# Patient Record
Sex: FEMALE
[De-identification: names, ages, dates, MRNs, and addresses within clinical notes are randomized per-mention and may not be internally consistent; named-entity substitution may affect disease eponyms.]

---

## 2021-10-28 ENCOUNTER — APPOINTMENT (OUTPATIENT)
Dept: INTERNAL MEDICINE | Facility: CLINIC | Age: 66
End: 2021-10-28
Payer: COMMERCIAL

## 2021-10-28 ENCOUNTER — NON-APPOINTMENT (OUTPATIENT)
Age: 66
End: 2021-10-28

## 2021-10-28 VITALS
SYSTOLIC BLOOD PRESSURE: 120 MMHG | RESPIRATION RATE: 13 BRPM | OXYGEN SATURATION: 97 % | DIASTOLIC BLOOD PRESSURE: 80 MMHG | WEIGHT: 137 LBS | BODY MASS INDEX: 22.02 KG/M2 | HEART RATE: 87 BPM | TEMPERATURE: 98 F | HEIGHT: 66 IN

## 2021-10-28 VITALS
OXYGEN SATURATION: 97 % | WEIGHT: 137 LBS | SYSTOLIC BLOOD PRESSURE: 120 MMHG | BODY MASS INDEX: 22.02 KG/M2 | HEIGHT: 66 IN | HEART RATE: 87 BPM | TEMPERATURE: 98.1 F | RESPIRATION RATE: 16 BRPM | DIASTOLIC BLOOD PRESSURE: 80 MMHG

## 2021-10-28 DIAGNOSIS — Z80.1 FAMILY HISTORY OF MALIGNANT NEOPLASM OF TRACHEA, BRONCHUS AND LUNG: ICD-10-CM

## 2021-10-28 DIAGNOSIS — Z78.9 OTHER SPECIFIED HEALTH STATUS: ICD-10-CM

## 2021-10-28 DIAGNOSIS — F32.A DEPRESSION, UNSPECIFIED: ICD-10-CM

## 2021-10-28 DIAGNOSIS — Z72.89 OTHER PROBLEMS RELATED TO LIFESTYLE: ICD-10-CM

## 2021-10-28 DIAGNOSIS — N96 RECURRENT PREGNANCY LOSS: ICD-10-CM

## 2021-10-28 DIAGNOSIS — Z83.49 FAMILY HISTORY OF OTHER ENDOCRINE, NUTRITIONAL AND METABOLIC DISEASES: ICD-10-CM

## 2021-10-28 DIAGNOSIS — F41.9 ANXIETY DISORDER, UNSPECIFIED: ICD-10-CM

## 2021-10-28 DIAGNOSIS — Z63.5 DISRUPTION OF FAMILY BY SEPARATION AND DIVORCE: ICD-10-CM

## 2021-10-28 PROCEDURE — 99203 OFFICE O/P NEW LOW 30 MIN: CPT

## 2021-10-28 SDOH — SOCIAL STABILITY - SOCIAL INSECURITY: DISRUPTION OF FAMILY BY SEPARATION AND DIVORCE: Z63.5

## 2021-10-28 NOTE — HISTORY OF PRESENT ILLNESS
[FreeTextEntry1] : New patient  [de-identified] : New patient \par Relatively healthy but "some stuff going on"\par off and on anxiety takes meds at 9a 9p\par Sees Dr Golden  doesn't want me to talk to him about \par \par she told him pt feels has pounding pusation in head when wakes up\par \par Depression up and down and wtaching her drinking, drinks beer 1-2 per day none since a day, occ MJ joint\par -feels like she's self medicating\par -she's drinking since she's 17\par \par When takes xanax doesn't want to drink\par \par Lost BF to COVID in 2021\par She got COVID after he , had to move out of his house\par cannot get out of her mind and now w a  man\par \par was doing well for a while but started to get more depressed\par \par

## 2022-05-10 ENCOUNTER — APPOINTMENT (OUTPATIENT)
Dept: UROGYNECOLOGY | Facility: CLINIC | Age: 67
End: 2022-05-10

## 2022-06-06 NOTE — REASON FOR VISIT
[Initial Visit ___] : an initial visit for [unfilled] [Questionnaire Received] : Patient questionnaire received [Pelvic Organ Prolapse] : pelvic organ prolapse

## 2022-06-07 ENCOUNTER — RESULT CHARGE (OUTPATIENT)
Age: 67
End: 2022-06-07

## 2022-06-07 ENCOUNTER — APPOINTMENT (OUTPATIENT)
Dept: UROGYNECOLOGY | Facility: CLINIC | Age: 67
End: 2022-06-07
Payer: COMMERCIAL

## 2022-06-07 DIAGNOSIS — Z63.4 DISAPPEARANCE AND DEATH OF FAMILY MEMBER: ICD-10-CM

## 2022-06-07 LAB
BILIRUB UR QL STRIP: NEGATIVE
CLARITY UR: CLEAR
COLLECTION METHOD: NORMAL
GLUCOSE UR-MCNC: NEGATIVE
HCG UR QL: 0.2 EU/DL
HGB UR QL STRIP.AUTO: NORMAL
KETONES UR-MCNC: NEGATIVE
LEUKOCYTE ESTERASE UR QL STRIP: NEGATIVE
NITRITE UR QL STRIP: NEGATIVE
PH UR STRIP: 7
PROT UR STRIP-MCNC: NEGATIVE
SP GR UR STRIP: 1.02

## 2022-06-07 PROCEDURE — 51701 INSERT BLADDER CATHETER: CPT

## 2022-06-07 PROCEDURE — 99204 OFFICE O/P NEW MOD 45 MIN: CPT | Mod: 25

## 2022-06-07 SDOH — SOCIAL STABILITY - SOCIAL INSECURITY: DISSAPEARANCE AND DEATH OF FAMILY MEMBER: Z63.4

## 2022-06-07 NOTE — OB HISTORY
[Total Preg ___] : : [unfilled] [Full Term ___] : [unfilled] (full-term) [Abortions ___] : [unfilled] (abortions) [Vaginal ___] : [unfilled] vaginal delivery(s) [AB Induced ___] : [unfilled] elective (s) [AB Spont ___] : [unfilled] miscarriage(s) [Approximately ___ (Month)] : the LMP was approximately [unfilled] month(s) ago [Last Pap Smear ___] : date of last pap smear was on [unfilled] [Sexually Active] : is not sexually active [FreeTextEntry1] : average weight baby as per patient

## 2022-06-07 NOTE — PROCEDURE
[Straight Catheterization] : insertion of a straight catheter [Stress Incontinence] : stress incontinence [Urgent Incontinence] : urgent incontinence [Urinary Frequency] : urinary frequency [Patient] : the patient [___ Fr Straight Tip] : a [unfilled] in Brazilian straight tip catheter [None] : there were no complications with the catheter insertion [Clear] : clear [Culture] : culture [Urinalysis] : urinalysis [No Complications] : no complications [Tolerated Well] : the patient tolerated the procedure well [Post procedure instructions and information given] : Post procedure instructions and information were given and reviewed with patient. [1] : 1 [FreeTextEntry1] : cathed to obtain pvr and uncontam specimen

## 2022-06-07 NOTE — ASSESSMENT
[FreeTextEntry1] : 67 yo with a rectocele. On exam, CST was neg however she had urethral hyperm. PVR was normal, and POPQ revealed a stage II ballooning rectocele. The patient has pelvic organ prolapse. Management options including observation, kegels with or without PT, biofeedback, pessary, and surgery were reviewed. Pessary care was reviewed. Surg options obliterative vs reconstructive, major vs minor, abd / robotic vs vaginal, with or without hysterectomy, with or without graft use discussed. Surg, [place] discussed. Risk of future uterine POP reviewed. She is interested in surg. RTO for UDS to test for OSUI/MUS candidacy; PRA after. All ques answered.\par \par Plan:\par [] f/u UA, UCx\par [] UDS\par [] PRA - antic booking [place]

## 2022-06-07 NOTE — PHYSICAL EXAM
[Chaperone Present] : A chaperone was present in the examining room during all aspects of the physical examination [No Acute Distress] : in no acute distress [Oriented x3] : oriented to person, place, and time [Tenderness] : ~T no ~M abdominal tenderness observed [Distended] : not distended [None] : no CVA tenderness [Labia Majora] : were normal [Labia Minora] : were normal [Bartholin's Gland] : both Bartholin's glands were normal  [Normal Appearance] : general appearance was normal [No Bleeding] : there was no active vaginal bleeding [2] : 2 [Aa ____] : Aa [unfilled] [Ba ____] : Ba [unfilled] [C ____] : C [unfilled] [GH ____] : GH [unfilled] [PB ____] : PB [unfilled] [TVL ____] : TVL  [unfilled] [Ap ____] : Ap [unfilled] [Bp ____] : Bp [unfilled] [D ____] : D [unfilled] [] : II [Normal] : normal [Soft] :  the cervix was soft [Post Void Residual ____ml] : post void residual was [unfilled] ml [Exam Deferred] : was deferred [FreeTextEntry3] : empty supine cst neg, +urethral hypermobility [FreeTextEntry4] : no mass cyst lesion [de-identified] : ballooning rectocele [de-identified] : nontender, no apprec mass

## 2022-06-07 NOTE — HISTORY OF PRESENT ILLNESS
[FreeTextEntry1] : 1 year bothersome bulge and pressure in vagina, palpated protrusion beyond introitus. No intervention as of yet. No vag bleeding, not sexually active. Nocturia and occasional freq without UI. No incomplete emptying, dysuria, UTIs, hematuria. Works in a nursing home.\par \par PMH anxiety, depression, BMI 22\par PSH breast lumpectomy\par NKDA

## 2022-06-09 LAB — BACTERIA UR CULT: NORMAL

## 2022-06-10 LAB
APPEARANCE: CLEAR
BACTERIA: NEGATIVE
BILIRUBIN URINE: NEGATIVE
BLOOD URINE: ABNORMAL
COLOR: YELLOW
GLUCOSE QUALITATIVE U: NEGATIVE
HYALINE CASTS: 1 /LPF
KETONES URINE: NEGATIVE
LEUKOCYTE ESTERASE URINE: NEGATIVE
MICROSCOPIC-UA: NORMAL
NITRITE URINE: NEGATIVE
PH URINE: 7
PROTEIN URINE: NORMAL
RED BLOOD CELLS URINE: 4 /HPF
SPECIFIC GRAVITY URINE: 1.03
SQUAMOUS EPITHELIAL CELLS: 1 /HPF
UROBILINOGEN URINE: NORMAL
WHITE BLOOD CELLS URINE: 1 /HPF

## 2022-06-13 ENCOUNTER — APPOINTMENT (OUTPATIENT)
Dept: INTERNAL MEDICINE | Facility: CLINIC | Age: 67
End: 2022-06-13

## 2022-06-20 ENCOUNTER — NON-APPOINTMENT (OUTPATIENT)
Age: 67
End: 2022-06-20

## 2022-07-06 ENCOUNTER — APPOINTMENT (OUTPATIENT)
Dept: UROGYNECOLOGY | Facility: CLINIC | Age: 67
End: 2022-07-06

## 2022-07-20 ENCOUNTER — APPOINTMENT (OUTPATIENT)
Dept: UROGYNECOLOGY | Facility: CLINIC | Age: 67
End: 2022-07-20

## 2022-08-03 ENCOUNTER — APPOINTMENT (OUTPATIENT)
Dept: UROGYNECOLOGY | Facility: CLINIC | Age: 67
End: 2022-08-03

## 2022-08-03 PROCEDURE — 51741 ELECTRO-UROFLOWMETRY FIRST: CPT

## 2022-08-03 PROCEDURE — 51729 CYSTOMETROGRAM W/VP&UP: CPT

## 2022-08-03 PROCEDURE — 51797 INTRAABDOMINAL PRESSURE TEST: CPT

## 2022-08-03 PROCEDURE — 51784 ANAL/URINARY MUSCLE STUDY: CPT

## 2022-08-04 ENCOUNTER — NON-APPOINTMENT (OUTPATIENT)
Age: 67
End: 2022-08-04

## 2022-08-11 ENCOUNTER — APPOINTMENT (OUTPATIENT)
Dept: OTOLARYNGOLOGY | Facility: CLINIC | Age: 67
End: 2022-08-11

## 2022-08-19 ENCOUNTER — APPOINTMENT (OUTPATIENT)
Dept: UROLOGY | Facility: CLINIC | Age: 67
End: 2022-08-19

## 2022-08-19 VITALS
BODY MASS INDEX: 22.76 KG/M2 | DIASTOLIC BLOOD PRESSURE: 67 MMHG | SYSTOLIC BLOOD PRESSURE: 101 MMHG | OXYGEN SATURATION: 97 % | HEART RATE: 58 BPM | HEIGHT: 67 IN | WEIGHT: 145 LBS

## 2022-08-19 DIAGNOSIS — N20.0 CALCULUS OF KIDNEY: ICD-10-CM

## 2022-08-19 PROCEDURE — 99204 OFFICE O/P NEW MOD 45 MIN: CPT

## 2022-08-19 NOTE — ASSESSMENT
[FreeTextEntry1] : Microhematuria:\par Discussed the differential diagnosis of hematuria including benign and malignant pathology- including but not limited to nephrolithiasis, bladder stone, urinary tract infection, glomerular disease, renal cancer, bladder cancer. \par Also discussed the chance that workup will not reveal a source for the bleeding. The patient understands that the hematuria could be from an upper tract (kidney or ureter) or lower tract (bladder, urethra) and that workup includes imaging and direct visualization of all of these.\par \par Recommended work up with Urinalysis with microscopy, Urine culture, Urine cytology, CT Urogram and Cystoscopy. \par Will consider Cystoscopy. \par \par Kidney stone:\par Discussed the management options for non obstructing kidney stones- watch and wait Vs Ureteroscopy Vs Shock wave lithotripsy- if radio-opaque or ultrasound amenable. \par Risks and benefits of each modality were discussed. \par Will review CT scan. \par \par Renal cyst:\par Discussed that Renal cysts are a common finding on routine radiological studies. Autopsy studies in patients over the age of 50 reveal greater than a 50% chance of having at least one simple renal cyst.\par And that renal cysts may be classified as simple or complex depending how they look on imaging. Discussed complexity of renal cysts and its implications as regards malignancy. \par Will review CT scan. \par  \par Return to office after CT scan.

## 2022-08-19 NOTE — HISTORY OF PRESENT ILLNESS
[FreeTextEntry1] : 66 year old female presents for Microhematuria, Renal cyst and Kidney stone. \par Saw Uro-Gynecologist for Prolapse and was found to have microhematuria. \par On Renal and Bladder Ultrasound: bilateral renal cysts and right kidney stones. \par No prior history or family history of kidney stone. \par Daytime frequency is every 2-3 hours or so. Nocturia of 1-2 x. \par Endorses off and on urgency.\par Denies dysuria, hematuria, fever, chills or rigors.\par Has off and on bilateral lateral abdominal and groin pain.\par Non smoker.

## 2022-08-19 NOTE — REVIEW OF SYSTEMS
[Negative] : Heme/Lymph [Sore Throat] : sore throat [Cough] : cough [Diarrhea] : diarrhea [Loss of interest] : loss of interest in sexual activity [Told you have blood in urine on a urine test] : told blood was present in a urine test [Wake up at night to urinate  How many times?  ___] : wakes up to urinate [unfilled] times during the night [Leakage of urine with urgency] : leakage of urine with urgency [Unaware of when urine is leaking] : unaware of when urine is leaking [Joint Pain] : joint pain [Joint Swelling] : joint swelling [Limb Swelling] : limb swelling

## 2022-08-23 LAB
APPEARANCE: CLEAR
BACTERIA UR CULT: NORMAL
BACTERIA: NEGATIVE
BILIRUBIN URINE: NEGATIVE
BLOOD URINE: NEGATIVE
COLOR: NORMAL
GLUCOSE QUALITATIVE U: NEGATIVE
HYALINE CASTS: 1 /LPF
KETONES URINE: NEGATIVE
LEUKOCYTE ESTERASE URINE: NEGATIVE
MICROSCOPIC-UA: NORMAL
NITRITE URINE: NEGATIVE
PH URINE: 7
PROTEIN URINE: NEGATIVE
RED BLOOD CELLS URINE: 1 /HPF
SPECIFIC GRAVITY URINE: 1.02
SQUAMOUS EPITHELIAL CELLS: 4 /HPF
URINE CYTOLOGY: NORMAL
UROBILINOGEN URINE: NORMAL
WHITE BLOOD CELLS URINE: 2 /HPF

## 2022-09-02 ENCOUNTER — NON-APPOINTMENT (OUTPATIENT)
Age: 67
End: 2022-09-02

## 2022-10-17 ENCOUNTER — APPOINTMENT (OUTPATIENT)
Dept: UROGYNECOLOGY | Facility: CLINIC | Age: 67
End: 2022-10-17

## 2022-10-19 ENCOUNTER — APPOINTMENT (OUTPATIENT)
Dept: UROGYNECOLOGY | Facility: CLINIC | Age: 67
End: 2022-10-19

## 2022-10-19 VITALS — SYSTOLIC BLOOD PRESSURE: 133 MMHG | DIASTOLIC BLOOD PRESSURE: 90 MMHG

## 2022-10-19 DIAGNOSIS — R15.9 FULL INCONTINENCE OF FECES: ICD-10-CM

## 2022-10-19 PROCEDURE — 99213 OFFICE O/P EST LOW 20 MIN: CPT

## 2022-10-20 NOTE — PHYSICAL EXAM
[No Acute Distress] : in no acute distress [Well developed] : well developed [Well Nourished] : ~L well nourished [Good Hygeine] : demonstrates good hygeine [Oriented x3] : oriented to person, place, and time [Normal Memory] : ~T memory was ~L unimpaired [Normal Mood/Affect] : mood and affect are normal [Normal Appearance] : ~T the appearance of the neck was normal [Normal Gait] : gait was normal [Cough] : no cough [No Edema] : ~T edema was present

## 2022-10-20 NOTE — DISCUSSION/SUMMARY
[FreeTextEntry1] : Long discussion with pt that she is having multiple different issues that are all treated differently.  We discussed that the CHANNING and rectocele can be surgically corrected when she decides to proceed.  Offered pessary in the meantime but she declines.  Discussed that many of her other issues are likely related to her poor diet.  We reviewed behavioral modifications in detail, discussed timed voids q2 hours during the day and I strongly advised that she change her diet to include more fiber/fruit/vegetables.  Advised metamucil daily for fecal incontinence.  She was asking about the relation between sugar and her urinary symptoms.  She has not had recent blood work but she is due to see her PMD.   Discussed that if she has elevated glucose levels, this could be worsening her urinary symptoms.  Pt advised to perform behavioral modifications for 4-6 weeks and to call if no improvement.  Instructed to call with any questions or concerns and she verbalizes understanding. \par \par [] behavioral modifications/timed voids\par [] metamucil daily

## 2022-10-20 NOTE — HISTORY OF PRESENT ILLNESS
[Cystocele (Obstetric)] : moderate [Vaginal Wall Prolapse] : no [Rectal Prolapse] : no [Stress Incontinence] : no [Unable To Restrain Bowel Movement] : mild [Urinary Frequency] : no [Feelings Of Urinary Urgency] : no [x2] : nocturia two times a night [Urinary Tract Infection] : mild [Hematuria] : no [Constipation Obstructed Defecation] : no [Incomplete Emptying Of Stool] : moderate [Diarrhea] : no [Pelvic Pain] : no [Vaginal Pain] : no [Bladder Pain] : no [Rectal Pain] : no [FreeTextEntry1] : \par Pt was seen 6/7/22 with rectocele, plan was for surgical correction.  Her insurance changed and she is waiting for a better time so she postponed surgery at this time.  She presents today with multiple complaints including "leakage when I walk", incontinence of stool stating "I just pooped all over myself", nocturia, ?urge incontinence and bothersome vaginal bulge.  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  She is not cooking for herself as she states she is renting a room and doesn't have a kitchen.  She is eating multiple sweets, daily alcohol and no fruit or vegetables.  She is drinking "a lot of water" and 1 cup coffee/day.  \par \par UDS showed CHANNING at capacity (500ml), no DO, no UUI.

## 2022-10-21 ENCOUNTER — APPOINTMENT (OUTPATIENT)
Dept: UROGYNECOLOGY | Facility: CLINIC | Age: 67
End: 2022-10-21

## 2022-10-26 ENCOUNTER — APPOINTMENT (OUTPATIENT)
Dept: INTERNAL MEDICINE | Facility: CLINIC | Age: 67
End: 2022-10-26

## 2023-01-20 ENCOUNTER — RESULT CHARGE (OUTPATIENT)
Age: 68
End: 2023-01-20

## 2023-01-20 ENCOUNTER — APPOINTMENT (OUTPATIENT)
Dept: UROGYNECOLOGY | Facility: CLINIC | Age: 68
End: 2023-01-20
Payer: COMMERCIAL

## 2023-01-20 DIAGNOSIS — R35.1 NOCTURIA: ICD-10-CM

## 2023-01-20 DIAGNOSIS — R31.29 OTHER MICROSCOPIC HEMATURIA: ICD-10-CM

## 2023-01-20 DIAGNOSIS — N20.0 CALCULUS OF KIDNEY: ICD-10-CM

## 2023-01-20 DIAGNOSIS — N28.1 CYST OF KIDNEY, ACQUIRED: ICD-10-CM

## 2023-01-20 DIAGNOSIS — N81.6 RECTOCELE: ICD-10-CM

## 2023-01-20 DIAGNOSIS — N39.3 STRESS INCONTINENCE (FEMALE) (MALE): ICD-10-CM

## 2023-01-20 LAB
BILIRUB UR QL STRIP: NEGATIVE
CLARITY UR: CLEAR
COLLECTION METHOD: NORMAL
GLUCOSE UR-MCNC: NEGATIVE
HCG UR QL: 0.2 EU/DL
HGB UR QL STRIP.AUTO: NORMAL
KETONES UR-MCNC: NEGATIVE
LEUKOCYTE ESTERASE UR QL STRIP: NORMAL
NITRITE UR QL STRIP: NEGATIVE
PH UR STRIP: 5
PROT UR STRIP-MCNC: NEGATIVE
SP GR UR STRIP: 1.02

## 2023-01-20 PROCEDURE — 81003 URINALYSIS AUTO W/O SCOPE: CPT | Mod: QW

## 2023-01-20 PROCEDURE — 52000 CYSTOURETHROSCOPY: CPT | Mod: 59

## 2023-01-20 PROCEDURE — 99212 OFFICE O/P EST SF 10 MIN: CPT | Mod: 25

## 2023-01-25 ENCOUNTER — APPOINTMENT (OUTPATIENT)
Dept: INTERNAL MEDICINE | Facility: CLINIC | Age: 68
End: 2023-01-25

## 2023-06-06 ENCOUNTER — APPOINTMENT (OUTPATIENT)
Dept: UROGYNECOLOGY | Facility: CLINIC | Age: 68
End: 2023-06-06
Payer: COMMERCIAL

## 2023-06-06 PROCEDURE — 99214 OFFICE O/P EST MOD 30 MIN: CPT

## 2023-06-06 NOTE — HISTORY OF PRESENT ILLNESS
[FreeTextEntry1] : 66 yo with symptomatic rectocele to 0. On exam, CST was neg however she had urethral hyperm. PVR was normal, and POPQ revealed a stage II ballooning rectocele (A -2 / C -7 / TVL -9, / P 0 ballooning). She also has MH. Imaging showed nonobst renal stone s/p Urol eval and cysto 2023 wnl. Desires surg intervention for POP and GSUI management, declines nonsurg intervention.\par \par She has been taken off her anxiety and depression meds, and is nearly tearful today. No SI/HI. Still bothered by vag bulge but reports years of bother causing distress in QOL with FI - if formed stool or if liquidy, rectal pressure, and feeling as though something is also "falling out of her anus." Reports colonoscopy a few years ago was OK - this was a screening scope. No VB. 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 ISD.\par Cysto: normal.\par Renal sono: B/L small renal cysts, nonobstructing 7 mm stones.\par CT: nonobstructing right renal stone, b/l renal cysts.\par \par PMH anxiety, depression, BMI 22\par PSH breast lumpectomy\par NKDA \par

## 2023-06-06 NOTE — ASSESSMENT
[FreeTextEntry1] : Rectocele, GSUI. \par \par We discussed the placement of a mid-urethral sling. Risks and benefits of a TOT sling versus a TVT vs mini sling routes were discussed including bladder and bowel perforation, vascular injury and hemorrhage, voiding dysfunction, UTIs, dyspareunia, and groin pain.\par \par Obliterative vs reconstructive surgery discussed, minor vs major procedures discussed. Abdominal versus vaginal routes of surgery were reviewed. Abdominal surgery via robotic laparoscopy vs laparotomy discussed. Surgery with or without hysterectomy discussed. Surgery with or without graft use discussed. Efficacies, risks, and benefits reviewed. Abdominally, a supracervical hysterectomy plus sacral colpopexy was discussed. Vaginally, a total hysterectomy plus uterosacral ligament suspension or sacrospinous fixation was discussed. \par A HUSSAIN versus total hysterectomy was discussed. With HUSSAIN, risk of cervical cancer in the future and need for routine pap smear surveillance was discussed. Benefits of HUSSAIN including decreased risk of mesh exposure was discussed. Bilateral salpingectomy was discussed to decrease the future risk of ovarian cancer. The benefit of oophorectomy to decrease the future risk of ovarian cancer or need for future ovarian surgery was discussed. The protective benefits of retaining ovaries for bone and cardiac health as well as decreased all-cause mortality rate was discussed. \par \par The risks and benefits of surgery were discussed and included: risks of general anesthesia, MI, stroke, cardiopulmonary arrest, infection, abscess formation, bleeding, hematoma formation, hemorrhage, transfusion, blood clot formation, fistula formation, rejection, dyspareunia, chronic pain, neuropathy, damage to surrounding structures including but not limited to bowel/ureters/bladder/rectum/nerves/vessels, leakage of urine, voiding dysfunction, OAB, urinary retention, procedure failure, recurrence, need for further surgery, and going home with a catheter. With the use of mesh, risk of mesh erosion or exposure discussed. The FDA warning on transvaginally placed mesh for prolapse correction versus abdominally placed mesh or transvaginally placed mesh for midurethral sling was reviewed. Website for the FDA provided for information as desired. An exam under anesthesia was discussed and consented to as well. Perioperative care, anesthesia, NPO prior to the surgery, and postop precautions were reviewed. All questions were answered. She understood and would like to proceed. Consent was signed and witnessed in the office.\par \par Plan:\par [] book EUA / [place] / perineorrhaphy / MUS / cystourethroscopy / other indicated procedures, possible laparotomy or laparoscopy --> change if needed based on CRS eval - if rectopexy needed, then consider RASCH / BSO / SCP / [place] / MUS with rectopexy?\par [] see CRS - referral info given\par [] PSTs\par [] medical clearance\par [] SW eval in hosp for help with rides to and from hospital, discuss with PSTs\par [] requesting 8 hours off postop for disability paperowrk - will send the papers in

## 2023-06-22 ENCOUNTER — APPOINTMENT (OUTPATIENT)
Dept: COLORECTAL SURGERY | Facility: CLINIC | Age: 68
End: 2023-06-22
Payer: COMMERCIAL

## 2023-06-22 VITALS
HEART RATE: 81 BPM | OXYGEN SATURATION: 100 % | DIASTOLIC BLOOD PRESSURE: 74 MMHG | HEIGHT: 66 IN | WEIGHT: 145 LBS | BODY MASS INDEX: 23.3 KG/M2 | RESPIRATION RATE: 14 BRPM | TEMPERATURE: 97.8 F | SYSTOLIC BLOOD PRESSURE: 121 MMHG

## 2023-06-22 DIAGNOSIS — K64.4 RESIDUAL HEMORRHOIDAL SKIN TAGS: ICD-10-CM

## 2023-06-22 PROCEDURE — 99204 OFFICE O/P NEW MOD 45 MIN: CPT | Mod: 25

## 2023-06-22 PROCEDURE — 46600 DIAGNOSTIC ANOSCOPY SPX: CPT

## 2023-06-22 NOTE — HISTORY OF PRESENT ILLNESS
[FreeTextEntry1] : Ms. Sanford presents to the office for consultation. She is to undergo pelvic floor repair with Dr. DISLA in near future. Here to determine if rectopexy is necessary. Pt reports erratic BMs, the need for splinting to properly defecate as well as fecal incontinence. She denies full thickness rectal prolapse. Main complaint today is in regards to a residual hemorrhoidal skin tag that has been present since time of son's birth. Last colonoscopy ~ 2 years prior and unremarkable.

## 2023-06-22 NOTE — PHYSICAL EXAM
[Normal rectal exam] : exam was normal [Reduce Spontaneously] : a spontaneously reducible (grade II) [Skin Tags] : residual hemorrhoidal skin tags were noted [Normal] : was normal [None] : there was no rectal mass  [Gross Blood] : no gross blood [No Rash or Lesion] : No rash or lesion [Alert] : alert [Oriented to Person] : oriented to person [Oriented to Place] : oriented to place [Oriented to Time] : oriented to time [Calm] : calm [de-identified] : right anterior [de-identified] : No apparent distress [de-identified] : Normocephalic atraumatic [de-identified] : Moving all extremities x4

## 2023-06-22 NOTE — ASSESSMENT
[FreeTextEntry1] : Ms. Sanford presents to the office for consultation to determine if rectopexy will be necessary at time of gyn uro repair. No clinical e/o rectal prolapse and ELEONORA demonstrates good tone. Relatively recent colonoscopy was otherwise unremarkable per pt report. Anoscopy in office today also unremarkable.  I reassured the patient that she would not need rectopexy.  She can proceed with Dr. DISLA's pelvic floor reconstruction surgery.  With regards to her residual hemorrhoidal skin tag, she plans to have this scheduled at another time to be completed in the office. She was advised of the elective nature of this office procedure, the duration of the procedure, methods of excision using lidocaine for anesthesia and cautery for hemostasis, post procedure pain and limitations on activities, methods for wound care, as well as the time until recovery. She understands and is agreeable.

## 2023-08-09 ENCOUNTER — OUTPATIENT (OUTPATIENT)
Dept: OUTPATIENT SERVICES | Facility: HOSPITAL | Age: 68
LOS: 1 days | End: 2023-08-09
Payer: COMMERCIAL

## 2023-08-09 VITALS
SYSTOLIC BLOOD PRESSURE: 110 MMHG | RESPIRATION RATE: 14 BRPM | OXYGEN SATURATION: 100 % | HEART RATE: 80 BPM | WEIGHT: 141.98 LBS | TEMPERATURE: 97 F | DIASTOLIC BLOOD PRESSURE: 70 MMHG | HEIGHT: 64.5 IN

## 2023-08-09 DIAGNOSIS — Z01.818 ENCOUNTER FOR OTHER PREPROCEDURAL EXAMINATION: ICD-10-CM

## 2023-08-09 DIAGNOSIS — Z98.890 OTHER SPECIFIED POSTPROCEDURAL STATES: Chronic | ICD-10-CM

## 2023-08-09 DIAGNOSIS — N39.3 STRESS INCONTINENCE (FEMALE) (MALE): ICD-10-CM

## 2023-08-09 DIAGNOSIS — N81.10 CYSTOCELE, UNSPECIFIED: ICD-10-CM

## 2023-08-09 DIAGNOSIS — N81.6 RECTOCELE: ICD-10-CM

## 2023-08-09 LAB
ALBUMIN SERPL ELPH-MCNC: 3.9 G/DL — SIGNIFICANT CHANGE UP (ref 3.3–5)
ALP SERPL-CCNC: 87 U/L — SIGNIFICANT CHANGE UP (ref 40–120)
ALT FLD-CCNC: 23 U/L — SIGNIFICANT CHANGE UP (ref 12–78)
ANION GAP SERPL CALC-SCNC: 3 MMOL/L — LOW (ref 5–17)
APPEARANCE UR: CLEAR — SIGNIFICANT CHANGE UP
APTT BLD: 31.4 SEC — SIGNIFICANT CHANGE UP (ref 24.5–35.6)
AST SERPL-CCNC: 12 U/L — LOW (ref 15–37)
BILIRUB SERPL-MCNC: 0.4 MG/DL — SIGNIFICANT CHANGE UP (ref 0.2–1.2)
BILIRUB UR-MCNC: NEGATIVE — SIGNIFICANT CHANGE UP
BUN SERPL-MCNC: 15 MG/DL — SIGNIFICANT CHANGE UP (ref 7–23)
CALCIUM SERPL-MCNC: 9.2 MG/DL — SIGNIFICANT CHANGE UP (ref 8.5–10.1)
CHLORIDE SERPL-SCNC: 108 MMOL/L — SIGNIFICANT CHANGE UP (ref 96–108)
CO2 SERPL-SCNC: 29 MMOL/L — SIGNIFICANT CHANGE UP (ref 22–31)
COLOR SPEC: YELLOW — SIGNIFICANT CHANGE UP
CREAT SERPL-MCNC: 0.66 MG/DL — SIGNIFICANT CHANGE UP (ref 0.5–1.3)
DIFF PNL FLD: ABNORMAL
EGFR: 97 ML/MIN/1.73M2 — SIGNIFICANT CHANGE UP
GLUCOSE SERPL-MCNC: 101 MG/DL — HIGH (ref 70–99)
GLUCOSE UR QL: NEGATIVE MG/DL — SIGNIFICANT CHANGE UP
HCT VFR BLD CALC: 41.1 % — SIGNIFICANT CHANGE UP (ref 34.5–45)
HGB BLD-MCNC: 14 G/DL — SIGNIFICANT CHANGE UP (ref 11.5–15.5)
INR BLD: 0.9 RATIO — SIGNIFICANT CHANGE UP (ref 0.85–1.18)
KETONES UR-MCNC: NEGATIVE MG/DL — SIGNIFICANT CHANGE UP
LEUKOCYTE ESTERASE UR-ACNC: ABNORMAL
MCHC RBC-ENTMCNC: 31.5 PG — SIGNIFICANT CHANGE UP (ref 27–34)
MCHC RBC-ENTMCNC: 34.1 GM/DL — SIGNIFICANT CHANGE UP (ref 32–36)
MCV RBC AUTO: 92.6 FL — SIGNIFICANT CHANGE UP (ref 80–100)
NITRITE UR-MCNC: NEGATIVE — SIGNIFICANT CHANGE UP
NRBC # BLD: 0 /100 WBCS — SIGNIFICANT CHANGE UP (ref 0–0)
PH UR: 5 — SIGNIFICANT CHANGE UP (ref 5–8)
PLATELET # BLD AUTO: 260 K/UL — SIGNIFICANT CHANGE UP (ref 150–400)
POTASSIUM SERPL-MCNC: 3.4 MMOL/L — LOW (ref 3.5–5.3)
POTASSIUM SERPL-SCNC: 3.4 MMOL/L — LOW (ref 3.5–5.3)
PROT SERPL-MCNC: 6.9 G/DL — SIGNIFICANT CHANGE UP (ref 6–8.3)
PROT UR-MCNC: NEGATIVE MG/DL — SIGNIFICANT CHANGE UP
PROTHROM AB SERPL-ACNC: 10.6 SEC — SIGNIFICANT CHANGE UP (ref 9.5–13)
RBC # BLD: 4.44 M/UL — SIGNIFICANT CHANGE UP (ref 3.8–5.2)
RBC # FLD: 12.3 % — SIGNIFICANT CHANGE UP (ref 10.3–14.5)
SODIUM SERPL-SCNC: 140 MMOL/L — SIGNIFICANT CHANGE UP (ref 135–145)
SP GR SPEC: 1.02 — SIGNIFICANT CHANGE UP (ref 1–1.03)
UROBILINOGEN FLD QL: 0.2 MG/DL — SIGNIFICANT CHANGE UP (ref 0.2–1)
WBC # BLD: 5.89 K/UL — SIGNIFICANT CHANGE UP (ref 3.8–10.5)
WBC # FLD AUTO: 5.89 K/UL — SIGNIFICANT CHANGE UP (ref 3.8–10.5)

## 2023-08-09 PROCEDURE — 36415 COLL VENOUS BLD VENIPUNCTURE: CPT

## 2023-08-09 PROCEDURE — 93010 ELECTROCARDIOGRAM REPORT: CPT

## 2023-08-09 PROCEDURE — 81001 URINALYSIS AUTO W/SCOPE: CPT

## 2023-08-09 PROCEDURE — G0463: CPT

## 2023-08-09 PROCEDURE — 85027 COMPLETE CBC AUTOMATED: CPT

## 2023-08-09 PROCEDURE — 93005 ELECTROCARDIOGRAM TRACING: CPT

## 2023-08-09 PROCEDURE — 85730 THROMBOPLASTIN TIME PARTIAL: CPT

## 2023-08-09 PROCEDURE — 85610 PROTHROMBIN TIME: CPT

## 2023-08-09 PROCEDURE — 87086 URINE CULTURE/COLONY COUNT: CPT

## 2023-08-09 PROCEDURE — 80053 COMPREHEN METABOLIC PANEL: CPT

## 2023-08-09 NOTE — H&P PST ADULT - ASSESSMENT
67 year old female PMH Acute Anxiety, Acute Depression, Fecal Incontinence, Genital Prolapse, Kidney Stone on Right Side, Microhematuria, Nocturia, Renal Cyst, Vitamin D Deficiency, COVID-19; Cystocele, Rectocele, Stress Incontinence Female; scheduled for  Perineorrhaphy - Posterior Repair - Midurethral Sling - Cystoscopy with Dr Noemí Morales on 8/22/2023.

## 2023-08-09 NOTE — H&P PST ADULT - NSICDXPASTMEDICALHX_GEN_ALL_CORE_FT
PAST MEDICAL HISTORY:  2019 novel coronavirus disease (COVID-19)     Acute anxiety     Acute depression     Fecal incontinence     Female cystocele     Genital prolapse     History of multiple miscarriages     Kidney stone on right side     Microhematuria     Nocturia     Rectocele     Renal cyst     Stress incontinence, female     Vaginal delivery     Vitamin D deficiency

## 2023-08-09 NOTE — H&P PST ADULT - NEGATIVE ENMT SYMPTOMS
no hearing difficulty/no vertigo/no sinus symptoms/no nasal congestion/no post-nasal discharge/no abnormal taste sensation/no throat pain/no dysphagia

## 2023-08-09 NOTE — H&P PST ADULT - PROBLEM SELECTOR PLAN 1
PST labs ( as per Dr Morales); CBC, CMP, U/A, Urine Culture, EKG. Medical clearance appointment scheduled with PCP Dr Christian on 8/16/23. Pt instructed to stop any NSAIDS/herbal Supplements between now and procedure. May take Tylenol if needed for any pain between now and procedure. No medications needed morning of procedure. Pre-op instructions given to pt with understanding verbalized. All questions addressed with pt prior to her leaving the PST department today. Reinforced with pt the need for her to have a ride home day of procedure ( or someone to accompany her in a cab/[employer]). Pt verbalizes understanding.

## 2023-08-09 NOTE — H&P PST ADULT - HISTORY OF PRESENT ILLNESS
67 year old female PMH Acute Anxiety, Acute Depression, Fecal Incontinence, Genital Prolapse, Kidney Stone on Right Side, Microhematuria, Nocturia, Renal Cyst, Vitamin D Deficiency, COVID-19; Cystocele, Rectocele, Stress Incontinence Female; presents today for PST prior to Perineorrhaphy - Posterior Repair - Midurethral Sling - Cystoscopy with Dr Noemí Morales on 8/22/2023.     Pt notes "my insides have dropped and I can no longer push it back in at this point."  Pt notes occasional  incontinence. Denies Dysuria, hematuria or increased urinary frequency. Pt notes "it has been affecting when I deficate more." Following  exam, DX studies and discussions with Dr Morales regarding treatment options pt is electing for scheduled procedure.

## 2023-08-09 NOTE — H&P PST ADULT - ATTENDING COMMENTS
Patient seen and examined, continues to desire surg correction of POP and GSUI, declines nonsurg intervention. Will proceed with EUA / [place] / perineorrhaphy / MUS / cystoscopy, other indicated procedures. Risks of surg including but not limited to risks of anesthesia, MI, stroke, cardiopulmonary arrest, bleeding, hematoma formation, hemorrhage, transfusion, infections, abscess formation, fistula formation, mesh-related complicated such as exposure or erosion, damage to surrounding structures such as bowel bladder ureters rectum nerves vessels, failure, recurrence, need for further surgery, persistent OAB and UTIs, going home with a catheter all reviewed and she would like to proceed. Consent signed and witnessed, all questions answered.    Keely Elam MD  [phone] (cell)

## 2023-08-10 LAB
CULTURE RESULTS: SIGNIFICANT CHANGE UP
SPECIMEN SOURCE: SIGNIFICANT CHANGE UP

## 2023-08-15 ENCOUNTER — APPOINTMENT (OUTPATIENT)
Dept: UROGYNECOLOGY | Facility: CLINIC | Age: 68
End: 2023-08-15
Payer: COMMERCIAL

## 2023-08-15 PROCEDURE — 99214 OFFICE O/P EST MOD 30 MIN: CPT

## 2023-08-15 NOTE — HISTORY OF PRESENT ILLNESS
[FreeTextEntry1] : 66 yo with symptomatic rectocele to 0. On exam, CST was neg however she had urethral hyperm. PVR was normal, and POPQ revealed a stage II ballooning rectocele (A -2 / C -7 / TVL -9, / P 0 ballooning). She also has MH. Imaging showed nonobst renal stone s/p Urol eval and cysto 2023 wnl. Desires surg intervention for POP and GSUI management, declines nonsurg intervention. She was evaluated per CRS - no rectopexy needed/no full thickness rectal POP. She also reports upper thigh and inguinal region pressure which I feel is likely unrelated to the rectocele - we discussed I don't anticipate surgery will help.  UDS: PVR normal, capacity normal, no DO, +GSUI, MUCPs normal, lowest LPP 57 cm however LPPs positive only at capacity - ? ISD. Cysto: normal. Renal sono: B/L small renal cysts, nonobstructing 7 mm stones. CT: nonobstructing right renal stone, b/l renal cysts.  PMH anxiety, depression, BMI 22 PSH breast lumpectomy NKDA

## 2023-08-15 NOTE — ASSESSMENT
[FreeTextEntry1] : Rectocele, GSUI.  We discussed the placement of a mid-urethral sling. Risks and benefits of a TOT sling versus a TVT vs mini sling routes were discussed including bladder and bowel perforation, vascular injury and hemorrhage, voiding dysfunction, UTIs, dyspareunia, and groin pain.  Obliterative vs reconstructive surgery discussed, minor vs major procedures discussed. Abdominal versus vaginal routes of surgery were reviewed. Abdominal surgery via robotic laparoscopy vs laparotomy discussed. Surgery with or without hysterectomy discussed. Surgery with or without graft use discussed. Efficacies, risks, and benefits reviewed. Abdominally, a supracervical hysterectomy plus sacral colpopexy was discussed. Vaginally, a total hysterectomy plus uterosacral ligament suspension or sacrospinous fixation was discussed.  A HUSSAIN versus total hysterectomy was discussed. With HUSSAIN, risk of cervical cancer in the future and need for routine pap smear surveillance was discussed. Benefits of HUSSAIN including decreased risk of mesh exposure was discussed. Bilateral salpingectomy was discussed to decrease the future risk of ovarian cancer. The benefit of oophorectomy to decrease the future risk of ovarian cancer or need for future ovarian surgery was discussed. The protective benefits of retaining ovaries for bone and cardiac health as well as decreased all-cause mortality rate was discussed.  The risks and benefits of surgery were discussed and included: risks of general anesthesia, MI, stroke, cardiopulmonary arrest, infection, abscess formation, bleeding, hematoma formation, hemorrhage, transfusion, blood clot formation, fistula formation, rejection, dyspareunia, chronic pain, neuropathy, damage to surrounding structures including but not limited to bowel/ureters/bladder/rectum/nerves/vessels, leakage of urine, voiding dysfunction, OAB, urinary retention, procedure failure, recurrence, need for further surgery, and going home with a catheter. With the use of mesh, risk of mesh erosion or exposure discussed. The FDA warning on transvaginally placed mesh for prolapse correction versus abdominally placed mesh or transvaginally placed mesh for midurethral sling was reviewed. Website for the FDA provided for information as desired. An exam under anesthesia was discussed and consented to as well. Perioperative care, anesthesia, NPO prior to the surgery, and postop precautions were reviewed. All questions were answered. She understood and would like to proceed. Consent was signed and witnessed in the office.  Plan: [] book EUA / [place] / perineorrhaphy / MUS / cystourethroscopy / other indicated procedures, possible laparotomy or laparoscopy  [] PSTs [] medical clearance [] requesting 8 weeks off postop for disability paperwork - left the papers with me today and is expecting them to be mailed to her home after they are filled out

## 2023-08-16 ENCOUNTER — APPOINTMENT (OUTPATIENT)
Dept: INTERNAL MEDICINE | Facility: CLINIC | Age: 68
End: 2023-08-16
Payer: COMMERCIAL

## 2023-08-16 VITALS
HEART RATE: 83 BPM | DIASTOLIC BLOOD PRESSURE: 70 MMHG | TEMPERATURE: 97.2 F | SYSTOLIC BLOOD PRESSURE: 114 MMHG | WEIGHT: 143 LBS | OXYGEN SATURATION: 98 % | RESPIRATION RATE: 14 BRPM | BODY MASS INDEX: 23.08 KG/M2

## 2023-08-16 DIAGNOSIS — N81.9 FEMALE GENITAL PROLAPSE, UNSPECIFIED: ICD-10-CM

## 2023-08-16 PROCEDURE — 99213 OFFICE O/P EST LOW 20 MIN: CPT

## 2023-08-16 RX ORDER — SERTRALINE HYDROCHLORIDE 100 MG/1
100 TABLET, FILM COATED ORAL
Refills: 0 | Status: DISCONTINUED | COMMUNITY
End: 2023-08-16

## 2023-08-16 RX ORDER — BUPROPION HYDROCHLORIDE 200 MG/1
200 TABLET, FILM COATED ORAL
Refills: 0 | Status: DISCONTINUED | COMMUNITY
End: 2023-08-16

## 2023-08-16 NOTE — ASSESSMENT
[High Risk Surgery - Intraperitoneal, Intrathoracic or Supringuinal Vascular Procedures] : High Risk Surgery - Intraperitoneal, Intrathoracic or Supringuinal Vascular Procedures - No (0) [Ischemic Heart Disease] : Ischemic Heart Disease - No (0) [Congestive Heart Failure] : Congestive Heart Failure - No (0) [Prior Cerebrovascular Accident or TIA] : Prior Cerebrovascular Accident or TIA - No (0) [Creatinine >= 2mg/dL (1 Point)] : Creatinine >= 2mg/dL - No (0) [0] : 0 , RCRI Class: I, Risk of Post-Op Cardiac Complications: 3.9%, 95% CI for Risk Estimate: 2.8% - 5.4% [Insulin-dependent Diabetic (1 Point)] : Insulin-dependent Diabetic - No (0) [Patient Optimized for Surgery] : Patient optimized for surgery [No Further Testing Recommended] : no further testing recommended

## 2023-08-16 NOTE — RESULTS/DATA
[] : results reviewed [de-identified] : mild low potassium to be supplemented today [de-identified] : Normal

## 2023-08-16 NOTE — HISTORY OF PRESENT ILLNESS
[No Pertinent Cardiac History] : no history of aortic stenosis, atrial fibrillation, coronary artery disease, recent myocardial infarction, or implantable device/pacemaker [No Pertinent Pulmonary History] : no history of asthma, COPD, sleep apnea, or smoking [No Adverse Anesthesia Reaction] : no adverse anesthesia reaction in self or family member [Chronic Anticoagulation] : no chronic anticoagulation [Chronic Kidney Disease] : no chronic kidney disease [Diabetes] : no diabetes [(Patient denies any chest pain, claudication, dyspnea on exertion, orthopnea, palpitations or syncope)] : Patient denies any chest pain, claudication, dyspnea on exertion, orthopnea, palpitations or syncope [Good (7-10 METs)] : Good (7-10 METs) [FreeTextEntry1] : prolapse repair w/ possible sling  [FreeTextEntry2] : 8/22/23 [FreeTextEntry3] : Dr Elam

## 2023-08-21 ENCOUNTER — TRANSCRIPTION ENCOUNTER (OUTPATIENT)
Age: 68
End: 2023-08-21

## 2023-08-21 NOTE — ASU PATIENT PROFILE, ADULT - AS SC BRADEN ACTIVITY
Completed forms faxed and scanned.  
Reason for Call:  Form, our goal is to have forms completed with 72 hours, however, some forms may require a visit or additional information.    Type of letter, form or note:  medical    Who is the form from?: [facility] (if other please explain)    Where did the form come from: form was faxed in    What clinic location was the form placed at?: [facility] - [phone]    Where the form was placed: [facility]    What number is listed as a contact on the form?: [phone]       Additional comments: please complete order,sign,date and return to fax [phone]    Call taken on 9/20/2017 at 1:45 PM by Laly West        
(4) walks frequently

## 2023-08-22 ENCOUNTER — APPOINTMENT (OUTPATIENT)
Dept: UROGYNECOLOGY | Facility: HOSPITAL | Age: 68
End: 2023-08-22

## 2023-08-22 ENCOUNTER — TRANSCRIPTION ENCOUNTER (OUTPATIENT)
Age: 68
End: 2023-08-22

## 2023-08-22 ENCOUNTER — OUTPATIENT (OUTPATIENT)
Dept: OUTPATIENT SERVICES | Facility: HOSPITAL | Age: 68
LOS: 1 days | End: 2023-08-22
Payer: COMMERCIAL

## 2023-08-22 VITALS
OXYGEN SATURATION: 98 % | SYSTOLIC BLOOD PRESSURE: 124 MMHG | RESPIRATION RATE: 14 BRPM | TEMPERATURE: 98 F | DIASTOLIC BLOOD PRESSURE: 68 MMHG | HEART RATE: 68 BPM

## 2023-08-22 VITALS
HEIGHT: 64.5 IN | WEIGHT: 141.98 LBS | TEMPERATURE: 98 F | OXYGEN SATURATION: 98 % | HEART RATE: 73 BPM | RESPIRATION RATE: 15 BRPM | DIASTOLIC BLOOD PRESSURE: 68 MMHG | SYSTOLIC BLOOD PRESSURE: 134 MMHG

## 2023-08-22 DIAGNOSIS — Z98.890 OTHER SPECIFIED POSTPROCEDURAL STATES: Chronic | ICD-10-CM

## 2023-08-22 DIAGNOSIS — N81.6 RECTOCELE: ICD-10-CM

## 2023-08-22 DIAGNOSIS — N39.3 STRESS INCONTINENCE (FEMALE) (MALE): ICD-10-CM

## 2023-08-22 DIAGNOSIS — N81.10 CYSTOCELE, UNSPECIFIED: ICD-10-CM

## 2023-08-22 PROCEDURE — 57288 REPAIR BLADDER DEFECT: CPT

## 2023-08-22 PROCEDURE — 86850 RBC ANTIBODY SCREEN: CPT

## 2023-08-22 PROCEDURE — 86900 BLOOD TYPING SEROLOGIC ABO: CPT

## 2023-08-22 PROCEDURE — C1771: CPT

## 2023-08-22 PROCEDURE — 57250 REPAIR RECTUM & VAGINA: CPT

## 2023-08-22 PROCEDURE — 86901 BLOOD TYPING SEROLOGIC RH(D): CPT

## 2023-08-22 PROCEDURE — 36415 COLL VENOUS BLD VENIPUNCTURE: CPT

## 2023-08-22 DEVICE — SLING TRANSVAGINAL MID-URETHRAL ADVANTAGE FIT: Type: IMPLANTABLE DEVICE | Status: FUNCTIONAL

## 2023-08-22 RX ORDER — SODIUM CHLORIDE 9 MG/ML
1000 INJECTION, SOLUTION INTRAVENOUS
Refills: 0 | Status: DISCONTINUED | OUTPATIENT
Start: 2023-08-22 | End: 2023-08-23

## 2023-08-22 RX ORDER — HYDROMORPHONE HYDROCHLORIDE 2 MG/ML
0.5 INJECTION INTRAMUSCULAR; INTRAVENOUS; SUBCUTANEOUS
Refills: 0 | Status: DISCONTINUED | OUTPATIENT
Start: 2023-08-22 | End: 2023-08-23

## 2023-08-22 RX ORDER — CEFAZOLIN SODIUM 1 G
2000 VIAL (EA) INJECTION ONCE
Refills: 0 | Status: COMPLETED | OUTPATIENT
Start: 2023-08-22 | End: 2023-08-22

## 2023-08-22 RX ORDER — SODIUM CHLORIDE 9 MG/ML
1000 INJECTION, SOLUTION INTRAVENOUS
Refills: 0 | Status: DISCONTINUED | OUTPATIENT
Start: 2023-08-22 | End: 2023-08-22

## 2023-08-22 RX ORDER — ONDANSETRON 8 MG/1
4 TABLET, FILM COATED ORAL ONCE
Refills: 0 | Status: COMPLETED | OUTPATIENT
Start: 2023-08-22 | End: 2023-08-22

## 2023-08-22 RX ORDER — HYDROMORPHONE HYDROCHLORIDE 2 MG/ML
1 INJECTION INTRAMUSCULAR; INTRAVENOUS; SUBCUTANEOUS
Refills: 0 | Status: DISCONTINUED | OUTPATIENT
Start: 2023-08-22 | End: 2023-08-23

## 2023-08-22 RX ADMIN — HYDROMORPHONE HYDROCHLORIDE 0.5 MILLIGRAM(S): 2 INJECTION INTRAMUSCULAR; INTRAVENOUS; SUBCUTANEOUS at 11:00

## 2023-08-22 RX ADMIN — ONDANSETRON 4 MILLIGRAM(S): 8 TABLET, FILM COATED ORAL at 13:12

## 2023-08-22 RX ADMIN — HYDROMORPHONE HYDROCHLORIDE 0.5 MILLIGRAM(S): 2 INJECTION INTRAMUSCULAR; INTRAVENOUS; SUBCUTANEOUS at 11:15

## 2023-08-22 RX ADMIN — HYDROMORPHONE HYDROCHLORIDE 0.5 MILLIGRAM(S): 2 INJECTION INTRAMUSCULAR; INTRAVENOUS; SUBCUTANEOUS at 11:30

## 2023-08-22 RX ADMIN — SODIUM CHLORIDE 75 MILLILITER(S): 9 INJECTION, SOLUTION INTRAVENOUS at 07:25

## 2023-08-22 RX ADMIN — HYDROMORPHONE HYDROCHLORIDE 0.5 MILLIGRAM(S): 2 INJECTION INTRAMUSCULAR; INTRAVENOUS; SUBCUTANEOUS at 12:01

## 2023-08-22 RX ADMIN — HYDROMORPHONE HYDROCHLORIDE 0.5 MILLIGRAM(S): 2 INJECTION INTRAMUSCULAR; INTRAVENOUS; SUBCUTANEOUS at 12:16

## 2023-08-22 RX ADMIN — SODIUM CHLORIDE 75 MILLILITER(S): 9 INJECTION, SOLUTION INTRAVENOUS at 10:57

## 2023-08-22 NOTE — ASU DISCHARGE PLAN (ADULT/PEDIATRIC) - ACCOMPANIED BY
Friend Other (Free Text): Recheck right jaw at next visit Render Risk Assessment In Note?: no Note Text (......Xxx Chief Complaint.): This diagnosis correlates with the Detail Level: Simple

## 2023-08-22 NOTE — ASU DISCHARGE PLAN (ADULT/PEDIATRIC) - CARE PROVIDER_API CALL
Noemí Elam  Female Pelvic Med/Reconst Surg  [address] [place] [address]  Phone: [phone]  Fax: [phone]  Established Patient  Follow Up Time:

## 2023-08-22 NOTE — ASU DISCHARGE PLAN (ADULT/PEDIATRIC) - ASU DC SPECIAL INSTRUCTIONSFT
Patient may have vaginal spotting for a few days. Please wear a pad  Nothing per vagina until cleared  Take motrin and tylenol as needed for pain   Please call your doctor in the case of any difficulties voiding/ urinating  F/u with your doctor in 2 weeks

## 2023-08-22 NOTE — BRIEF OPERATIVE NOTE - NSICDXBRIEFPROCEDURE_GEN_ALL_CORE_FT
PROCEDURES:  Posterior vaginal repair 22-Aug-2023 10:50:04  Joan Fairchild  Perineorrhaphy with creation of urethral sling using tension-free vaginal tape 22-Aug-2023 10:50:50  Joan Farichild

## 2023-08-22 NOTE — BRIEF OPERATIVE NOTE - NSICDXBRIEFPROCEDURE_GEN_ALL_CORE_FT
PROCEDURES:  Posterior vaginal repair 22-Aug-2023 10:50:04  Joan Fairchild  Perineorrhaphy with creation of urethral sling using tension-free vaginal tape 22-Aug-2023 10:50:50  Joan Fairchild

## 2023-08-22 NOTE — ASU DISCHARGE PLAN (ADULT/PEDIATRIC) - NS MD DC FALL RISK RISK
Physical Therapy Daily Treatment Note  [address] [facility] [address], [address]  [place] [address]  Visit # 2      Subjective Evaluation    History of Present Illness    Subjective comment: Pt reports that her (R) knee is hurting today from driving to therapy.Pain  Current pain ratin           Objective   See Exercise, Manual, and Modality Logs for complete treatment.       Assessment & Plan     Assessment    Assessment details: Pt completed treatment with no c/o increased pain.  She was able to progress reps on her exercises with no issue.  She tolerated the addition of closed chain exercises.  Pt's HEP was updated and given to her.  Kt tape reapplied due to report of it helping with s/s.                     Manual Therapy:    0     mins  56871;  Therapeutic Exercise:    23     mins  89451;     Neuromuscular Louann:   8    mins  25341;    Therapeutic Activity:     0     mins  29584;     Gait Trainin     mins  62210;     Ultrasound:     0     mins  28362;    Work Hardening           0      mins 09509  Iontophoresis               0   mins 87257  E-Stim                          _0_ mins 18537 ( )    Timed Treatment:   31   mins   Total Treatment:     31   mins    Tito Bermudez PTA  Physical Therapist Assistant  
For information on Fall & Injury Prevention, visit: [email or web address] OR  [email or web address] OR  https://www.cdc.gov/steadi/patient.html

## 2023-08-22 NOTE — ASU DISCHARGE PLAN (ADULT/PEDIATRIC) - DO NOT DRIVE IF TAKING PAIN MEDICATION
Detail Level: Simple Additional Notes: Patient consent was obtained to proceed with the visit and recommended plan of care after discussion of all risks and benefits, including the risks of COVID-19 exposure. NULL

## 2023-08-22 NOTE — ASU DISCHARGE PLAN (ADULT/PEDIATRIC) - CARE PROVIDER_API CALL
Noemí Elam  Female Pelvic Med/Reconst Surg  [address] [place] [address]  Phone: [phone]  Fax: [phone]  Established Patient  Follow Up Time:

## 2023-08-22 NOTE — ASU DISCHARGE PLAN (ADULT/PEDIATRIC) - NS MD DC FALL RISK RISK
For information on Fall & Injury Prevention, visit: [email or web address] OR  [email or web address] OR  https://www.cdc.gov/steadi/patient.html

## 2023-08-22 NOTE — BRIEF OPERATIVE NOTE - OPERATION/FINDINGS
Ballooning Rectocele repaired in usual finding with suture plication and with out difficulty. Perineorraphy performed in usual fashion  Suburethral sling placed in usual fashion from Suprapubic approach and without difficulty using [employer] fit.

## 2023-08-22 NOTE — BRIEF OPERATIVE NOTE - OPERATION/FINDINGS
Ballooning Rectocele repaired in usual finding with suture plication and with out difficulty. Perineorraphy performed in usual fashion  Suburethral sling placed in usual fashion from Suprapubic approach and without difficulty using [employer] Advantage fit.

## 2023-08-22 NOTE — BRIEF OPERATIVE NOTE - NSICDXBRIEFPOSTOP_GEN_ALL_CORE_FT
POST-OP DIAGNOSIS:  Urinary, incontinence, stress female 22-Aug-2023 10:51:43  Joan Fairchild  Rectocele 22-Aug-2023 10:51:56  Joan Fairchild

## 2023-08-22 NOTE — BRIEF OPERATIVE NOTE - NSICDXBRIEFPREOP_GEN_ALL_CORE_FT
PRE-OP DIAGNOSIS:  Urinary, incontinence, stress female 22-Aug-2023 10:51:31  Joan Fairchild  Rectocele 22-Aug-2023 10:51:16  Joan Fairchild

## 2023-08-23 ENCOUNTER — RX RENEWAL (OUTPATIENT)
Age: 68
End: 2023-08-23

## 2023-09-05 ENCOUNTER — APPOINTMENT (OUTPATIENT)
Dept: UROGYNECOLOGY | Facility: CLINIC | Age: 68
End: 2023-09-05
Payer: COMMERCIAL

## 2023-09-05 VITALS
WEIGHT: 143 LBS | BODY MASS INDEX: 22.98 KG/M2 | SYSTOLIC BLOOD PRESSURE: 114 MMHG | HEIGHT: 66 IN | DIASTOLIC BLOOD PRESSURE: 74 MMHG

## 2023-09-05 DIAGNOSIS — Z98.890 OTHER SPECIFIED POSTPROCEDURAL STATES: ICD-10-CM

## 2023-09-05 LAB
BILIRUB UR QL STRIP: NEGATIVE
CLARITY UR: CLEAR
COLLECTION METHOD: NORMAL
GLUCOSE UR-MCNC: NEGATIVE
HCG UR QL: 0.2 EU/DL
HGB UR QL STRIP.AUTO: NORMAL
KETONES UR-MCNC: NEGATIVE
LEUKOCYTE ESTERASE UR QL STRIP: NORMAL
NITRITE UR QL STRIP: NEGATIVE
PH UR STRIP: 5
PROT UR STRIP-MCNC: NEGATIVE
SP GR UR STRIP: 1

## 2023-09-05 PROCEDURE — 81003 URINALYSIS AUTO W/O SCOPE: CPT | Mod: QW

## 2023-09-05 PROCEDURE — 99024 POSTOP FOLLOW-UP VISIT: CPT

## 2023-09-05 NOTE — OBJECTIVE
[Post Void Residual ____ ml] : Post Void Residual was [unfilled] ml [Soft and Nontender] : soft and nontender [Clean, Dry, Intact] : Clean, Dry, Intact [FreeTextEntry2] : nontender, no active bleeding noted dermabond remains on mons incision intact.

## 2023-09-05 NOTE — DISCUSSION/SUMMARY
[Post-Op instructions given. Pt/family verbalizes understanding] : post-operative instructions were provided to the patient/family who verbalize understanding [FreeTextEntry1] : Post-op activity restrictions discussed including pelvic rest, no heavy lifting, pushing or pulling. Follow-up in 4 weeks for 6-week exam or sooner if needed. Instructed to call with questions or concerns.

## 2023-09-05 NOTE — SUBJECTIVE
[FreeTextEntry1] : Overall doing well and is very happy with surgery. [FreeTextEntry8] : None new. [FreeTextEntry7] : Mild rectal pressure with sitting resolves when she stands and ibuprofen prn.  [FreeTextEntry6] : Normal. [FreeTextEntry5] : Denies dysuria, incomplete emptying and reports she hasn't noticed a lot of coughing or laughing to notice resolution of stress incontinence yet.  [FreeTextEntry4] : Normal with use of fiber. Last BM was this morning.  [FreeTextEntry3] : Normal [FreeTextEntry2] : Normal.

## 2023-09-13 ENCOUNTER — APPOINTMENT (OUTPATIENT)
Dept: COLORECTAL SURGERY | Facility: CLINIC | Age: 68
End: 2023-09-13

## 2023-09-19 PROBLEM — R15.9 FULL INCONTINENCE OF FECES: Chronic | Status: ACTIVE | Noted: 2023-08-09

## 2023-09-19 PROBLEM — U07.1 COVID-19: Chronic | Status: ACTIVE | Noted: 2023-08-09

## 2023-09-19 PROBLEM — F41.9 ANXIETY DISORDER, UNSPECIFIED: Chronic | Status: ACTIVE | Noted: 2023-08-09

## 2023-09-19 PROBLEM — N96 RECURRENT PREGNANCY LOSS: Chronic | Status: ACTIVE | Noted: 2023-08-09

## 2023-09-19 PROBLEM — N81.6 RECTOCELE: Chronic | Status: ACTIVE | Noted: 2023-08-09

## 2023-09-20 PROBLEM — R31.29 OTHER MICROSCOPIC HEMATURIA: Chronic | Status: ACTIVE | Noted: 2023-08-09

## 2023-09-20 PROBLEM — E55.9 VITAMIN D DEFICIENCY, UNSPECIFIED: Chronic | Status: ACTIVE | Noted: 2023-08-09

## 2023-09-20 PROBLEM — N81.9 FEMALE GENITAL PROLAPSE, UNSPECIFIED: Chronic | Status: ACTIVE | Noted: 2023-08-09

## 2023-09-20 PROBLEM — N20.0 CALCULUS OF KIDNEY: Chronic | Status: ACTIVE | Noted: 2023-08-09

## 2023-09-20 PROBLEM — N81.10 CYSTOCELE, UNSPECIFIED: Chronic | Status: ACTIVE | Noted: 2023-08-09

## 2023-09-20 PROBLEM — N28.1 CYST OF KIDNEY, ACQUIRED: Chronic | Status: ACTIVE | Noted: 2023-08-09

## 2023-09-20 PROBLEM — N39.3 STRESS INCONTINENCE (FEMALE) (MALE): Chronic | Status: ACTIVE | Noted: 2023-08-09

## 2023-09-20 PROBLEM — R35.1 NOCTURIA: Chronic | Status: ACTIVE | Noted: 2023-08-09

## 2023-09-20 PROBLEM — F32.A DEPRESSION, UNSPECIFIED: Chronic | Status: ACTIVE | Noted: 2023-08-09

## 2023-10-03 ENCOUNTER — APPOINTMENT (OUTPATIENT)
Dept: UROGYNECOLOGY | Facility: CLINIC | Age: 68
End: 2023-10-03
Payer: COMMERCIAL

## 2023-10-03 VITALS
HEART RATE: 69 BPM | SYSTOLIC BLOOD PRESSURE: 116 MMHG | BODY MASS INDEX: 22.98 KG/M2 | WEIGHT: 143 LBS | HEIGHT: 66 IN | OXYGEN SATURATION: 99 % | DIASTOLIC BLOOD PRESSURE: 69 MMHG

## 2023-10-03 PROCEDURE — 99024 POSTOP FOLLOW-UP VISIT: CPT

## 2023-10-04 ENCOUNTER — APPOINTMENT (OUTPATIENT)
Dept: INTERNAL MEDICINE | Facility: CLINIC | Age: 68
End: 2023-10-04
Payer: COMMERCIAL

## 2023-10-04 VITALS
WEIGHT: 143 LBS | SYSTOLIC BLOOD PRESSURE: 112 MMHG | BODY MASS INDEX: 22.98 KG/M2 | DIASTOLIC BLOOD PRESSURE: 70 MMHG | HEART RATE: 70 BPM | HEIGHT: 66 IN | RESPIRATION RATE: 16 BRPM | TEMPERATURE: 97 F | OXYGEN SATURATION: 97 %

## 2023-10-04 DIAGNOSIS — R10.31 RIGHT LOWER QUADRANT PAIN: ICD-10-CM

## 2023-10-04 DIAGNOSIS — E87.6 HYPOKALEMIA: ICD-10-CM

## 2023-10-04 DIAGNOSIS — E56.9 VITAMIN DEFICIENCY, UNSPECIFIED: ICD-10-CM

## 2023-10-04 DIAGNOSIS — R10.32 RIGHT LOWER QUADRANT PAIN: ICD-10-CM

## 2023-10-04 PROCEDURE — 99214 OFFICE O/P EST MOD 30 MIN: CPT

## 2023-10-04 RX ORDER — MELOXICAM 15 MG/1
15 TABLET ORAL
Qty: 90 | Refills: 3 | Status: ACTIVE | COMMUNITY
Start: 2023-10-04 | End: 1900-01-01

## 2023-10-06 DIAGNOSIS — M54.50 LOW BACK PAIN, UNSPECIFIED: ICD-10-CM

## 2023-10-06 RX ORDER — CELECOXIB 200 MG/1
200 CAPSULE ORAL
Qty: 180 | Refills: 3 | Status: ACTIVE | COMMUNITY
Start: 2023-10-06 | End: 1900-01-01

## 2023-10-09 NOTE — CONSULT LETTER
[Dear  ___] : Dear  [unfilled], [Consult Letter:] : I had the pleasure of evaluating your patient, [unfilled]. [Please see my note below.] : Please see my note below. [Consult Closing:] : Thank you very much for allowing me to participate in the care of this patient.  If you have any questions, please do not hesitate to contact me. [Sincerely,] : Sincerely, [FreeTextEntry3] : Sirena Martin MD\par  Melolabial Interpolation Flap Division And Inset Text: Division and inset of the melolabial interpolation flap was performed to achieve optimal aesthetic result, restore normal anatomic appearance and avoid distortion of normal anatomy, expedite and facilitate wound healing, achieve optimal functional result and because linear closure either not possible or would produce suboptimal result. The patient was prepped and draped in the usual manner. The pedicle was infiltrated with local anesthesia. The pedicle was sectioned with a #15 blade. The pedicle was de-bulked and trimmed to match the shape of the defect. Hemostasis was achieved. The flap donor site and free margin of the flap were secured with deep buried sutures and the wound edges were re-approximated.

## 2023-11-08 ENCOUNTER — APPOINTMENT (OUTPATIENT)
Dept: INTERNAL MEDICINE | Facility: CLINIC | Age: 68
End: 2023-11-08

## 2023-12-15 ENCOUNTER — APPOINTMENT (OUTPATIENT)
Dept: UROGYNECOLOGY | Facility: CLINIC | Age: 68
End: 2023-12-15
Payer: COMMERCIAL

## 2023-12-15 ENCOUNTER — APPOINTMENT (OUTPATIENT)
Dept: UROGYNECOLOGY | Facility: CLINIC | Age: 68
End: 2023-12-15

## 2023-12-15 VITALS — DIASTOLIC BLOOD PRESSURE: 70 MMHG | SYSTOLIC BLOOD PRESSURE: 114 MMHG | HEART RATE: 82 BPM

## 2023-12-15 PROCEDURE — 99213 OFFICE O/P EST LOW 20 MIN: CPT

## 2023-12-15 PROCEDURE — 51798 US URINE CAPACITY MEASURE: CPT

## 2023-12-15 PROCEDURE — 81003 URINALYSIS AUTO W/O SCOPE: CPT | Mod: QW

## 2023-12-15 NOTE — ADDENDUM
[FreeTextEntry1] : This note was written by Lisseth Das, acting as the  for Dr. Elam. This note accurately reflects the work and decisions made by Dr. Elam.

## 2023-12-15 NOTE — HISTORY OF PRESENT ILLNESS
[FreeTextEntry1] : Patient underwent EUA / TVT MUS / [place] / perineorrhaphy / cystoscopy 08/22/2023. Last seen for scheduled postop visit 10/03/2023. She is concerned that the bulge may be dropping again. Denies that she began to feel recurrent dropping sensation during an episode of lifting a heavy object or rigorous physical activity. She does endorse a physical active job as well as walking on the treadmill at the gym. Reports she feels a bulge when inserting her finger into vagina.   Preop: UDS: PVR normal, capacity normal, no DO, +GSUI, MUCPs normal, lowest LPP 57 cm however LPPs positive only at capacity - ? ISD. Cysto: normal. Renal sono: B/L small renal cysts, nonobstructing 7 mm stones. CT: nonobstructing right renal stone, b/l renal cysts.  PMH anxiety, depression, BMI 22 PSH breast lumpectomy NKDA

## 2023-12-15 NOTE — ASSESSMENT
[FreeTextEntry1] : Patient is 4 months s/p EUA / TVT MUS / [place] / perineorrhaphy / cystoscopy on 08/22/2023. New onset of stage I cystocele, not present before surgery. Images were shown to educate patient. Patient is not bothered by symptom at this time, discussed physical therapy, pessary, or surgical options if it were to progress.   f/u PRN

## 2023-12-15 NOTE — PHYSICAL EXAM
[Chaperone Present] : A chaperone was present in the examining room during all aspects of the physical examination [No Acute Distress] : in no acute distress [Well developed] : well developed [Well Nourished] : ~L well nourished [Oriented x3] : oriented to person, place, and time [No Edema] : ~T edema was not present [Warm and Dry] : was warm and dry to touch [Cough] : no cough [Tenderness] : ~T no ~M abdominal tenderness observed [Distended] : not distended [None] : no CVA tenderness [Labia Majora] : were normal [Labia Minora] : were normal [Bartholin's Gland] : both Bartholin's glands were normal  [Aa ____] : Aa [unfilled] [Ba ____] : Ba [unfilled] [C ____] : C [unfilled] [GH ____] : GH [unfilled] [PB ____] : PB [unfilled] [TVL ____] : TVL  [unfilled] [Ap ____] : Ap [unfilled] [Bp ____] : Bp [unfilled] [D ____] : D [unfilled] [] : II [Normal] : normal [Soft] :  the cervix was soft [FreeTextEntry3] : empty supine cst neg [FreeTextEntry4] : suture lines intact, no sutures, no mesh exposure, good reduction of posterior POP, Good support, healing well and no masses or tenderness.

## 2024-01-22 ENCOUNTER — APPOINTMENT (OUTPATIENT)
Dept: RADIOLOGY | Facility: HOSPITAL | Age: 69
End: 2024-01-22
Payer: COMMERCIAL

## 2024-01-22 ENCOUNTER — OUTPATIENT (OUTPATIENT)
Dept: OUTPATIENT SERVICES | Facility: HOSPITAL | Age: 69
LOS: 1 days | End: 2024-01-22
Payer: COMMERCIAL

## 2024-01-22 ENCOUNTER — RESULT REVIEW (OUTPATIENT)
Age: 69
End: 2024-01-22

## 2024-01-22 DIAGNOSIS — Z98.890 OTHER SPECIFIED POSTPROCEDURAL STATES: Chronic | ICD-10-CM

## 2024-01-22 DIAGNOSIS — R10.31 RIGHT LOWER QUADRANT PAIN: ICD-10-CM

## 2024-01-22 PROCEDURE — 73521 X-RAY EXAM HIPS BI 2 VIEWS: CPT | Mod: 26

## 2024-01-22 PROCEDURE — 77085 DXA BONE DENSITY AXL VRT FX: CPT

## 2024-01-22 PROCEDURE — 77085 DXA BONE DENSITY AXL VRT FX: CPT | Mod: 26

## 2024-01-22 PROCEDURE — 72100 X-RAY EXAM L-S SPINE 2/3 VWS: CPT | Mod: 26

## 2024-01-22 PROCEDURE — 72100 X-RAY EXAM L-S SPINE 2/3 VWS: CPT

## 2024-01-22 PROCEDURE — 73521 X-RAY EXAM HIPS BI 2 VIEWS: CPT

## 2024-02-05 ENCOUNTER — APPOINTMENT (OUTPATIENT)
Dept: INTERNAL MEDICINE | Facility: CLINIC | Age: 69
End: 2024-02-05
Payer: COMMERCIAL

## 2024-02-05 VITALS
DIASTOLIC BLOOD PRESSURE: 68 MMHG | SYSTOLIC BLOOD PRESSURE: 130 MMHG | HEIGHT: 66 IN | OXYGEN SATURATION: 97 % | TEMPERATURE: 97.4 F | WEIGHT: 151 LBS | BODY MASS INDEX: 24.27 KG/M2 | RESPIRATION RATE: 16 BRPM | HEART RATE: 104 BPM

## 2024-02-05 DIAGNOSIS — M25.561 PAIN IN RIGHT KNEE: ICD-10-CM

## 2024-02-05 DIAGNOSIS — M25.562 PAIN IN RIGHT KNEE: ICD-10-CM

## 2024-02-05 DIAGNOSIS — N81.10 CYSTOCELE, UNSPECIFIED: ICD-10-CM

## 2024-02-05 DIAGNOSIS — N32.9 BLADDER DISORDER, UNSPECIFIED: ICD-10-CM

## 2024-02-05 DIAGNOSIS — M47.816 SPONDYLOSIS W/OUT MYELOPATHY OR RADICULOPATHY, LUMBAR REGION: ICD-10-CM

## 2024-02-05 DIAGNOSIS — M16.0 BILATERAL PRIMARY OSTEOARTHRITIS OF HIP: ICD-10-CM

## 2024-02-05 DIAGNOSIS — G89.29 PAIN IN RIGHT KNEE: ICD-10-CM

## 2024-02-05 DIAGNOSIS — Z00.00 ENCOUNTER FOR GENERAL ADULT MEDICAL EXAMINATION W/OUT ABNORMAL FINDINGS: ICD-10-CM

## 2024-02-05 PROCEDURE — G2211 COMPLEX E/M VISIT ADD ON: CPT

## 2024-02-05 PROCEDURE — 99214 OFFICE O/P EST MOD 30 MIN: CPT

## 2024-02-05 RX ORDER — POTASSIUM CHLORIDE 1500 MG/1
20 TABLET, FILM COATED, EXTENDED RELEASE ORAL
Qty: 5 | Refills: 0 | Status: DISCONTINUED | COMMUNITY
Start: 2023-08-16 | End: 2024-02-05

## 2024-02-05 RX ORDER — TRAMADOL HYDROCHLORIDE AND ACETAMINOPHEN 37.5; 325 MG/1; MG/1
37.5-325 TABLET, FILM COATED ORAL
Qty: 60 | Refills: 0 | Status: ACTIVE | COMMUNITY
Start: 2024-02-05 | End: 1900-01-01

## 2024-02-05 NOTE — HISTORY OF PRESENT ILLNESS
[FreeTextEntry1] : bilat hip pain left groin pain and prolapse returned [de-identified] : Saw her surgeon rectocoel repair and "sling" are

## 2024-02-09 ENCOUNTER — OUTPATIENT (OUTPATIENT)
Dept: OUTPATIENT SERVICES | Facility: HOSPITAL | Age: 69
LOS: 1 days | End: 2024-02-09
Payer: COMMERCIAL

## 2024-02-09 ENCOUNTER — APPOINTMENT (OUTPATIENT)
Dept: RADIOLOGY | Facility: HOSPITAL | Age: 69
End: 2024-02-09
Payer: COMMERCIAL

## 2024-02-09 ENCOUNTER — APPOINTMENT (OUTPATIENT)
Dept: ORTHOPEDIC SURGERY | Facility: CLINIC | Age: 69
End: 2024-02-09

## 2024-02-09 DIAGNOSIS — M25.561 PAIN IN RIGHT KNEE: ICD-10-CM

## 2024-02-09 DIAGNOSIS — Z98.890 OTHER SPECIFIED POSTPROCEDURAL STATES: Chronic | ICD-10-CM

## 2024-02-09 PROCEDURE — 73562 X-RAY EXAM OF KNEE 3: CPT | Mod: 26,50

## 2024-02-09 PROCEDURE — 73562 X-RAY EXAM OF KNEE 3: CPT

## 2024-02-12 ENCOUNTER — APPOINTMENT (OUTPATIENT)
Dept: ORTHOPEDIC SURGERY | Facility: CLINIC | Age: 69
End: 2024-02-12
Payer: COMMERCIAL

## 2024-02-12 VITALS — WEIGHT: 150 LBS | HEIGHT: 65 IN | BODY MASS INDEX: 24.99 KG/M2

## 2024-02-12 PROCEDURE — 73502 X-RAY EXAM HIP UNI 2-3 VIEWS: CPT

## 2024-02-12 PROCEDURE — 99204 OFFICE O/P NEW MOD 45 MIN: CPT

## 2024-02-12 NOTE — HISTORY OF PRESENT ILLNESS
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  Presents complaining of spontaneous onset of chronic intermittent pain left greater than right hip pain in the groin radiating into the thigh and knee.  Symptoms provoked by walking moving.  Reports associated limp.  Patient did not experience relief in response to oral nonsteroidal anti-inflammatory medication.  Reports strong family history of arthritis.  Ambulates independently

## 2024-02-12 NOTE — PHYSICAL EXAM
[de-identified] : Constitutional:Well nourished , well developed and in no acute distress Psychiatric: Alert and oriented to time place and person.Appropriate affect  Skin:Head, neck, arms and lower extremities:no lesions or discoloration HEENT: Normocephalic, EOM intact, Nasal septum midline, Respiratory: Unlabored respirations,no audible wheezing ,no tachypnea, no cyanosis Cardiovascular: no leg swelling  no ankle edema no JVD, pulse regular Vascular: no calf or thigh tenderness,  Peripheral pulses; intact Lymphatics:No groin adenopathy,no lymphedema lower  or upper extremities Left hip passive range of motion restricted provokes pain flexion 105 internal 5 external 50 abduction 30 adduction 30, right hip passive motion restricted painful flexion 90 internal 10 external 30  abduction 30 adduction 30 [de-identified] :   x-rays AP pelvis and right and left hip demonstrate end-stage degenerative narrowing bone-on-bone subchondral sclerosis

## 2024-02-12 NOTE — DISCUSSION/SUMMARY
[de-identified] : Impression end-stage osteoarthritis right left hip Plan given compromised quality life and failure to respond to  medical management to date I recommended total hip replacement and reviewed risk benefits and alternatives

## 2024-02-14 ENCOUNTER — TRANSCRIPTION ENCOUNTER (OUTPATIENT)
Age: 69
End: 2024-02-14

## 2024-02-16 ENCOUNTER — NON-APPOINTMENT (OUTPATIENT)
Age: 69
End: 2024-02-16

## 2024-02-22 LAB
25(OH)D3 SERPL-MCNC: 21.2 NG/ML
ALBUMIN SERPL ELPH-MCNC: 4.8 G/DL
ALP BLD-CCNC: 82 U/L
ALT SERPL-CCNC: 13 U/L
ANION GAP SERPL CALC-SCNC: 11 MMOL/L
APPEARANCE: CLEAR
AST SERPL-CCNC: 16 U/L
BACTERIA: NEGATIVE /HPF
BASOPHILS # BLD AUTO: 0.02 K/UL
BASOPHILS NFR BLD AUTO: 0.3 %
BILIRUB SERPL-MCNC: 0.4 MG/DL
BILIRUBIN URINE: NEGATIVE
BLOOD URINE: ABNORMAL
BUN SERPL-MCNC: 19 MG/DL
CALCIUM SERPL-MCNC: 9.8 MG/DL
CAST: 0 /LPF
CHLORIDE SERPL-SCNC: 103 MMOL/L
CHOLEST SERPL-MCNC: 234 MG/DL
CO2 SERPL-SCNC: 29 MMOL/L
COLOR: YELLOW
CREAT SERPL-MCNC: 0.72 MG/DL
EGFR: 92 ML/MIN/1.73M2
EOSINOPHIL # BLD AUTO: 0.28 K/UL
EOSINOPHIL NFR BLD AUTO: 4.6 %
EPITHELIAL CELLS: 11 /HPF
ESTIMATED AVERAGE GLUCOSE: 117 MG/DL
GLUCOSE QUALITATIVE U: NEGATIVE MG/DL
GLUCOSE SERPL-MCNC: 92 MG/DL
HBA1C MFR BLD HPLC: 5.7 %
HCT VFR BLD CALC: 45.1 %
HDLC SERPL-MCNC: 47 MG/DL
HGB BLD-MCNC: 14.7 G/DL
IMM GRANULOCYTES NFR BLD AUTO: 0.2 %
KETONES URINE: NEGATIVE MG/DL
LDLC SERPL CALC-MCNC: 157 MG/DL
LEUKOCYTE ESTERASE URINE: ABNORMAL
LYMPHOCYTES # BLD AUTO: 1.73 K/UL
LYMPHOCYTES NFR BLD AUTO: 28.5 %
MAN DIFF?: NORMAL
MCHC RBC-ENTMCNC: 30.4 PG
MCHC RBC-ENTMCNC: 32.6 GM/DL
MCV RBC AUTO: 93.2 FL
MICROSCOPIC-UA: NORMAL
MONOCYTES # BLD AUTO: 0.47 K/UL
MONOCYTES NFR BLD AUTO: 7.7 %
NEUTROPHILS # BLD AUTO: 3.56 K/UL
NEUTROPHILS NFR BLD AUTO: 58.7 %
NITRITE URINE: NEGATIVE
NONHDLC SERPL-MCNC: 187 MG/DL
PH URINE: 5.5
PLATELET # BLD AUTO: 281 K/UL
POTASSIUM SERPL-SCNC: 4.8 MMOL/L
PROT SERPL-MCNC: 6.6 G/DL
PROTEIN URINE: NEGATIVE MG/DL
RBC # BLD: 4.84 M/UL
RBC # FLD: 12.6 %
RED BLOOD CELLS URINE: 10 /HPF
REVIEW: NORMAL
SODIUM SERPL-SCNC: 143 MMOL/L
SPECIFIC GRAVITY URINE: 1.02
TRIGL SERPL-MCNC: 168 MG/DL
TSH SERPL-ACNC: 2.46 UIU/ML
UROBILINOGEN URINE: 0.2 MG/DL
WBC # FLD AUTO: 6.07 K/UL
WHITE BLOOD CELLS URINE: 2 /HPF

## 2024-02-23 ENCOUNTER — APPOINTMENT (OUTPATIENT)
Dept: UROGYNECOLOGY | Facility: CLINIC | Age: 69
End: 2024-02-23
Payer: COMMERCIAL

## 2024-02-23 PROCEDURE — 57160 INSERT PESSARY/OTHER DEVICE: CPT

## 2024-02-23 PROCEDURE — A4562: CPT

## 2024-02-23 NOTE — PHYSICAL EXAM
[Chaperone Present] : A chaperone was present in the examining room during all aspects of the physical examination [No Acute Distress] : in no acute distress [Well developed] : well developed [Well Nourished] : ~L well nourished [Oriented x3] : oriented to person, place, and time [No Edema] : ~T edema was not present [Warm and Dry] : was warm and dry to touch [Vulvar Atrophy] : vulvar atrophy [Labia Majora] : were normal [Labia Minora] : were normal [Normal Appearance] : general appearance was normal [Atrophy] : atrophy [Normal] : was normal [Aa ____] : Aa [unfilled] [Ba ____] : Ba [unfilled] [C ____] : C [unfilled] [GH ____] : GH [unfilled] [PB ____] : PB [unfilled] [Ap ____] : Ap [unfilled] [TVL ____] : TVL  [unfilled] [Bp ____] : Bp [unfilled] [D ____] : D [unfilled] [Cough] : no cough [Tenderness] : ~T no ~M abdominal tenderness observed [Distended] : not distended [FreeTextEntry3] : neg CST [FreeTextEntry4] : no mesh exposure

## 2024-02-23 NOTE — ADDENDUM
[FreeTextEntry1] : This note was written by Lisseth Das, acting as the  for Dr. Quezada. This note accurately reflects the work and decisions made by Dr. Quezada.

## 2024-02-23 NOTE — DISCUSSION/SUMMARY
[FreeTextEntry1] : SYLVIA is a 68 year female who presents for initial pessary fitting.   [] Fit with ring with support #4 - new supply  [] f/u with ortho re pessary during hip replacement   f/u 2 weeks All questions answered.

## 2024-02-23 NOTE — HISTORY OF PRESENT ILLNESS
[Rectal Prolapse] : mild [] : months ago [Urge Incontinence Of Urine] : no [Unable To Restrain Bowel Movement] : no [Urinary Frequency] : no [Urinary Tract Infection] : no [FreeTextEntry1] : SYLVIA is a 68 year female who presents for initial pessary fitting. Patient has MH and h/o stones. Patient of Dr. Elam's, last seen in office 12/15/2023. Patient underwent EUA / TVT MUS / [place] / perineorrhaphy / cystoscopy 08/22/2023. New onset of stage I cystocele, not present before surgery. Here to be fit with new supply pessary. Undergoing left hip replacement 04/09/2024, inquiring if she will need pessary removed preop.   Ua with micro 02/09/2024 - 10 RBCs, 11 epithelial cells, small leukocyte esterase, moderate blood  HgbA1C 5.7 on 02/09/2024  UDS with CHANNING, no DO, no UUI 08/03/2022 Normal cysto 01/20/2023 Renal sono 08/01/2022 - B/L small renal cysts, nonobstructing 7 mm stones. CT Abdomen and Pelvis 09/01/2022 - nonobstructing right renal stone, b/l renal cysts.  PMH anxiety, depression, BMI 22 PSH breast lumpectomy NKDA

## 2024-02-23 NOTE — PROCEDURE
[Good Fit] : fits well [New] : new [Pessary Inserted] : inserted [None] : no bleeding [Medication Review] : Medicaiton Review: Patient verbalizes understanding of risks and benefits [Fluid Management] : Fluid Management: patient verbalizes understanding 6-10 cups per day [Bowel Management] : Bowel Management: patient verbalizes understanding of daily dietary fiber intake [Bladder Training] : Bladder Training: Patient given information with verbal understanding [Refit] : refit needed [Erosion] : no evidence of erosion [Discharge] : no vaginal discharge [Erythema] : no erythema [Infection] : no evidence of infection [FreeTextEntry1] : ring with support #4 - new supply

## 2024-02-26 ENCOUNTER — APPOINTMENT (OUTPATIENT)
Dept: UROLOGY | Facility: CLINIC | Age: 69
End: 2024-02-26

## 2024-03-05 ENCOUNTER — OUTPATIENT (OUTPATIENT)
Dept: OUTPATIENT SERVICES | Facility: HOSPITAL | Age: 69
LOS: 1 days | Discharge: HOME | End: 2024-03-05
Payer: COMMERCIAL

## 2024-03-05 DIAGNOSIS — Z98.890 OTHER SPECIFIED POSTPROCEDURAL STATES: Chronic | ICD-10-CM

## 2024-03-08 ENCOUNTER — OUTPATIENT (OUTPATIENT)
Dept: OUTPATIENT SERVICES | Facility: HOSPITAL | Age: 69
LOS: 1 days | End: 2024-03-08
Payer: COMMERCIAL

## 2024-03-08 ENCOUNTER — APPOINTMENT (OUTPATIENT)
Dept: UROGYNECOLOGY | Facility: CLINIC | Age: 69
End: 2024-03-08

## 2024-03-08 VITALS
HEART RATE: 87 BPM | HEIGHT: 65.5 IN | WEIGHT: 149.91 LBS | DIASTOLIC BLOOD PRESSURE: 71 MMHG | RESPIRATION RATE: 18 BRPM | SYSTOLIC BLOOD PRESSURE: 124 MMHG | OXYGEN SATURATION: 98 % | TEMPERATURE: 98 F

## 2024-03-08 DIAGNOSIS — K62.3 RECTAL PROLAPSE: Chronic | ICD-10-CM

## 2024-03-08 DIAGNOSIS — Z98.890 OTHER SPECIFIED POSTPROCEDURAL STATES: Chronic | ICD-10-CM

## 2024-03-08 DIAGNOSIS — M16.12 UNILATERAL PRIMARY OSTEOARTHRITIS, LEFT HIP: ICD-10-CM

## 2024-03-08 DIAGNOSIS — N81.81 PERINEOCELE: Chronic | ICD-10-CM

## 2024-03-08 LAB
A1C WITH ESTIMATED AVERAGE GLUCOSE RESULT: 5.5 % — SIGNIFICANT CHANGE UP (ref 4–5.6)
ALBUMIN SERPL ELPH-MCNC: 4 G/DL — SIGNIFICANT CHANGE UP (ref 3.3–5)
ALP SERPL-CCNC: 80 U/L — SIGNIFICANT CHANGE UP (ref 30–120)
ALT FLD-CCNC: 21 U/L — SIGNIFICANT CHANGE UP (ref 10–60)
ANION GAP SERPL CALC-SCNC: 8 MMOL/L — SIGNIFICANT CHANGE UP (ref 5–17)
APTT BLD: 31.4 SEC — SIGNIFICANT CHANGE UP (ref 24.5–35.6)
AST SERPL-CCNC: 17 U/L — SIGNIFICANT CHANGE UP (ref 10–40)
BILIRUB SERPL-MCNC: 0.4 MG/DL — SIGNIFICANT CHANGE UP (ref 0.2–1.2)
BLD GP AB SCN SERPL QL: SIGNIFICANT CHANGE UP
BUN SERPL-MCNC: 20 MG/DL — SIGNIFICANT CHANGE UP (ref 7–23)
CALCIUM SERPL-MCNC: 9.2 MG/DL — SIGNIFICANT CHANGE UP (ref 8.4–10.5)
CHLORIDE SERPL-SCNC: 105 MMOL/L — SIGNIFICANT CHANGE UP (ref 96–108)
CO2 SERPL-SCNC: 28 MMOL/L — SIGNIFICANT CHANGE UP (ref 22–31)
CREAT SERPL-MCNC: 0.5 MG/DL — SIGNIFICANT CHANGE UP (ref 0.5–1.3)
EGFR: 103 ML/MIN/1.73M2 — SIGNIFICANT CHANGE UP
ESTIMATED AVERAGE GLUCOSE: 111 MG/DL — SIGNIFICANT CHANGE UP (ref 68–114)
GLUCOSE SERPL-MCNC: 91 MG/DL — SIGNIFICANT CHANGE UP (ref 70–99)
HCT VFR BLD CALC: 40 % — SIGNIFICANT CHANGE UP (ref 34.5–45)
HGB BLD-MCNC: 13.3 G/DL — SIGNIFICANT CHANGE UP (ref 11.5–15.5)
INR BLD: 1.02 RATIO — SIGNIFICANT CHANGE UP (ref 0.85–1.18)
MCHC RBC-ENTMCNC: 30.6 PG — SIGNIFICANT CHANGE UP (ref 27–34)
MCHC RBC-ENTMCNC: 33.3 GM/DL — SIGNIFICANT CHANGE UP (ref 32–36)
MCV RBC AUTO: 92 FL — SIGNIFICANT CHANGE UP (ref 80–100)
NRBC # BLD: 0 /100 WBCS — SIGNIFICANT CHANGE UP (ref 0–0)
PLATELET # BLD AUTO: 241 K/UL — SIGNIFICANT CHANGE UP (ref 150–400)
POTASSIUM SERPL-MCNC: 3.7 MMOL/L — SIGNIFICANT CHANGE UP (ref 3.5–5.3)
POTASSIUM SERPL-SCNC: 3.7 MMOL/L — SIGNIFICANT CHANGE UP (ref 3.5–5.3)
PROT SERPL-MCNC: 7.1 G/DL — SIGNIFICANT CHANGE UP (ref 6–8.3)
PROTHROM AB SERPL-ACNC: 11.4 SEC — SIGNIFICANT CHANGE UP (ref 9.5–13)
RBC # BLD: 4.35 M/UL — SIGNIFICANT CHANGE UP (ref 3.8–5.2)
RBC # FLD: 12.5 % — SIGNIFICANT CHANGE UP (ref 10.3–14.5)
SODIUM SERPL-SCNC: 141 MMOL/L — SIGNIFICANT CHANGE UP (ref 135–145)
WBC # BLD: 6.56 K/UL — SIGNIFICANT CHANGE UP (ref 3.8–10.5)
WBC # FLD AUTO: 6.56 K/UL — SIGNIFICANT CHANGE UP (ref 3.8–10.5)

## 2024-03-08 PROCEDURE — 93010 ELECTROCARDIOGRAM REPORT: CPT

## 2024-03-08 NOTE — H&P PST ADULT - HISTORY OF PRESENT ILLNESS
67 year old female PMH Acute Anxiety, Acute Depression, Fecal Incontinence, Genital Prolapse, Kidney Stone on Right Side, Microhematuria, Nocturia, Renal Cyst, Vitamin D Deficiency, COVID-19; Cystocele, Rectocele, Stress Incontinence Female; presents today for PST prior to left total hip replacement. Patient states the pain wake her up middle of the night. She rate 8/10 pain. some relief with Tylenol 500 mg daily. Failed conservative therapy. Patient is scheduled for Left total hip replacement on 03/21/2024     67 year old female PMH Acute Anxiety, Acute Depression, Fecal Incontinence, Genital Prolapse, Kidney Stone on Right Side, Microhematuria, Nocturia, Renal Cyst, Vitamin D Deficiency, COVID-19; Cystocele, Rectocele, Stress Incontinence Female; presents today for PST prior to left total hip replacement. Patient states the pain is severe at night and  wakes  her up middle of the night. She rate 8/10 pain. Some  relief with Tylenol 500 mg daily. Failed conservative therapy. Patient is scheduled for Left total hip replacement on 03/21/2024

## 2024-03-08 NOTE — H&P PST ADULT - NSICDXPASTSURGICALHX_GEN_ALL_CORE_FT
PAST SURGICAL HISTORY:  H/O breast biopsy     H/O dilation and curettage     Perineocele     Rectal prolapse     Status post creation of urethral sling by suprapubic approach

## 2024-03-08 NOTE — H&P PST ADULT - PROBLEM SELECTOR PLAN 1
scheduled for Left anterior Hip replacement   pre-op instructions provided  Will obtain medical clearance

## 2024-03-08 NOTE — H&P PST ADULT - NSICDXPASTMEDICALHX_GEN_ALL_CORE_FT
PAST MEDICAL HISTORY:  2019 novel coronavirus disease (COVID-19)     Acute anxiety     Acute depression     Fecal incontinence     Female cystocele     Genital prolapse     History of multiple miscarriages     Kidney stone on right side     Microhematuria     Nocturia     Osteoarthritis     Osteoarthritis of left hip     Rectocele     Renal cyst     Stress incontinence, female     Vaginal delivery     Vitamin D deficiency

## 2024-03-09 LAB
MRSA PCR RESULT.: SIGNIFICANT CHANGE UP
S AUREUS DNA NOSE QL NAA+PROBE: SIGNIFICANT CHANGE UP

## 2024-03-14 ENCOUNTER — APPOINTMENT (OUTPATIENT)
Dept: RADIATION ONCOLOGY | Facility: CLINIC | Age: 69
End: 2024-03-14
Payer: COMMERCIAL

## 2024-03-14 PROCEDURE — 99024 POSTOP FOLLOW-UP VISIT: CPT

## 2024-03-15 ENCOUNTER — APPOINTMENT (OUTPATIENT)
Dept: INTERNAL MEDICINE | Facility: CLINIC | Age: 69
End: 2024-03-15
Payer: COMMERCIAL

## 2024-03-15 VITALS
HEART RATE: 84 BPM | OXYGEN SATURATION: 98 % | DIASTOLIC BLOOD PRESSURE: 70 MMHG | RESPIRATION RATE: 16 BRPM | HEIGHT: 65 IN | BODY MASS INDEX: 23.99 KG/M2 | TEMPERATURE: 98 F | SYSTOLIC BLOOD PRESSURE: 120 MMHG | WEIGHT: 144 LBS

## 2024-03-15 DIAGNOSIS — Z01.818 ENCOUNTER FOR OTHER PREPROCEDURAL EXAMINATION: ICD-10-CM

## 2024-03-15 PROCEDURE — 99213 OFFICE O/P EST LOW 20 MIN: CPT

## 2024-03-15 RX ORDER — CHLORHEXIDINE GLUCONATE 213 G/1000ML
1 SOLUTION TOPICAL ONCE
Refills: 0 | Status: DISCONTINUED | OUTPATIENT
Start: 2024-03-21 | End: 2024-03-25

## 2024-03-15 NOTE — HISTORY OF PRESENT ILLNESS
[No Pertinent Cardiac History] : no history of aortic stenosis, atrial fibrillation, coronary artery disease, recent myocardial infarction, or implantable device/pacemaker [Aortic Stenosis] : no aortic stenosis [Atrial Fibrillation] : no atrial fibrillation [Coronary Artery Disease] : no coronary artery disease [Recent Myocardial Infarction] : no recent myocardial infarction [Implantable Device/Pacemaker] : no implantable device/pacemaker [No Pertinent Pulmonary History] : no history of asthma, COPD, sleep apnea, or smoking [Asthma] : no asthma [COPD] : no COPD [Sleep Apnea] : no sleep apnea [Smoker] : not a smoker [No Adverse Anesthesia Reaction] : no adverse anesthesia reaction in self or family member [Family Member] : no family member with adverse anesthesia reaction/sudden death [Self] : no previous adverse anesthesia reaction [(Patient denies any chest pain, claudication, dyspnea on exertion, orthopnea, palpitations or syncope)] : Patient denies any chest pain, claudication, dyspnea on exertion, orthopnea, palpitations or syncope [Moderate (4-6 METs)] : Moderate (4-6 METs) [FreeTextEntry1] : LEFT ANTERIOR HIP REPLACEMENT [FreeTextEntry2] : 3/21/2024 [FreeTextEntry3] : Dr James No

## 2024-03-18 ENCOUNTER — APPOINTMENT (OUTPATIENT)
Dept: ORTHOPEDIC SURGERY | Facility: CLINIC | Age: 69
End: 2024-03-18
Payer: COMMERCIAL

## 2024-03-18 VITALS — HEIGHT: 65 IN | WEIGHT: 144 LBS | BODY MASS INDEX: 23.99 KG/M2

## 2024-03-18 DIAGNOSIS — M16.0 BILATERAL PRIMARY OSTEOARTHRITIS OF HIP: ICD-10-CM

## 2024-03-18 DIAGNOSIS — M16.12 UNILATERAL PRIMARY OSTEOARTHRITIS, LEFT HIP: ICD-10-CM

## 2024-03-18 PROCEDURE — 99213 OFFICE O/P EST LOW 20 MIN: CPT

## 2024-03-18 NOTE — PHYSICAL EXAM
[de-identified] : Constitutional:Well nourished , well developed and in no acute distress Psychiatric: Alert and oriented to time place and person.Appropriate affect  Skin:Head, neck, arms and lower extremities:no lesions or discoloration HEENT: Normocephalic, EOM intact, Nasal septum midline, Respiratory: Unlabored respirations,no audible wheezing ,no tachypnea, no cyanosis Cardiovascular: no leg swelling  no ankle edema no JVD, pulse regular Vascular: no calf or thigh tenderness,  Peripheral pulses; intact Lymphatics:No groin adenopathy,no lymphedema lower  or upper extremities Left hip passive range of motion restricted provokes pain flexion 105 internal 5 external 50 abduction 30 adduction 30, right hip passive motion restricted painful flexion 90 internal 10 external 30  abduction 30 adduction 30Skin intact left hip [de-identified] :   Review priorx-rays AP pelvis and right and left hip demonstrate end-stage degenerative narrowing bone-on-bone subchondral sclerosis

## 2024-03-18 NOTE — DISCUSSION/SUMMARY
[de-identified] : Impression end-stage osteoarthritis Left hip Plan given compromised quality life and failure to respond to  medical management to date I recommended total hip replacement and reviewed risk benefits and alternatives

## 2024-03-18 NOTE — HISTORY OF PRESENT ILLNESS
[de-identified] : Follow-up chronic intermittent pain left greater than right hip pain in the groin radiating into the thigh and knee.  Symptoms provoked by walking moving.  Reports associated limp.  Patient did not experience relief in response to oral nonsteroidal anti-inflammatory medication.  Reports strong family history of arthritis.  Ambulates independently

## 2024-03-19 DIAGNOSIS — M16.12 UNILATERAL PRIMARY OSTEOARTHRITIS, LEFT HIP: ICD-10-CM

## 2024-03-20 ENCOUNTER — TRANSCRIPTION ENCOUNTER (OUTPATIENT)
Age: 69
End: 2024-03-20

## 2024-03-20 ENCOUNTER — NON-APPOINTMENT (OUTPATIENT)
Age: 69
End: 2024-03-20

## 2024-03-20 PROCEDURE — 77280 THER RAD SIMULAJ FIELD SMPL: CPT | Mod: 26

## 2024-03-20 PROCEDURE — 77431 RADIATION THERAPY MANAGEMENT: CPT

## 2024-03-20 PROCEDURE — 77300 RADIATION THERAPY DOSE PLAN: CPT | Mod: 26

## 2024-03-20 PROCEDURE — 77261 THER RADIOLOGY TX PLNG SMPL: CPT

## 2024-03-20 RX ORDER — SODIUM CHLORIDE 9 MG/ML
500 INJECTION INTRAMUSCULAR; INTRAVENOUS; SUBCUTANEOUS ONCE
Refills: 0 | Status: COMPLETED | OUTPATIENT
Start: 2024-03-21 | End: 2024-03-21

## 2024-03-20 RX ORDER — PANTOPRAZOLE SODIUM 20 MG/1
40 TABLET, DELAYED RELEASE ORAL
Refills: 0 | Status: DISCONTINUED | OUTPATIENT
Start: 2024-03-21 | End: 2024-03-25

## 2024-03-20 RX ORDER — ACETAMINOPHEN 500 MG
1000 TABLET ORAL EVERY 8 HOURS
Refills: 0 | Status: DISCONTINUED | OUTPATIENT
Start: 2024-03-21 | End: 2024-03-25

## 2024-03-20 RX ORDER — ONDANSETRON 8 MG/1
4 TABLET, FILM COATED ORAL EVERY 6 HOURS
Refills: 0 | Status: DISCONTINUED | OUTPATIENT
Start: 2024-03-21 | End: 2024-03-25

## 2024-03-20 RX ORDER — OXYCODONE HYDROCHLORIDE 5 MG/1
10 TABLET ORAL
Refills: 0 | Status: DISCONTINUED | OUTPATIENT
Start: 2024-03-21 | End: 2024-03-25

## 2024-03-20 RX ORDER — HYDROMORPHONE HYDROCHLORIDE 2 MG/ML
0.5 INJECTION INTRAMUSCULAR; INTRAVENOUS; SUBCUTANEOUS
Refills: 0 | Status: DISCONTINUED | OUTPATIENT
Start: 2024-03-21 | End: 2024-03-25

## 2024-03-20 RX ORDER — SENNA PLUS 8.6 MG/1
2 TABLET ORAL AT BEDTIME
Refills: 0 | Status: DISCONTINUED | OUTPATIENT
Start: 2024-03-21 | End: 2024-03-25

## 2024-03-20 RX ORDER — POLYETHYLENE GLYCOL 3350 17 G/17G
17 POWDER, FOR SOLUTION ORAL AT BEDTIME
Refills: 0 | Status: DISCONTINUED | OUTPATIENT
Start: 2024-03-21 | End: 2024-03-25

## 2024-03-20 RX ORDER — OXYCODONE HYDROCHLORIDE 5 MG/1
5 TABLET ORAL
Refills: 0 | Status: DISCONTINUED | OUTPATIENT
Start: 2024-03-21 | End: 2024-03-25

## 2024-03-20 RX ORDER — CELECOXIB 200 MG/1
200 CAPSULE ORAL EVERY 12 HOURS
Refills: 0 | Status: DISCONTINUED | OUTPATIENT
Start: 2024-03-21 | End: 2024-03-25

## 2024-03-20 RX ORDER — MAGNESIUM HYDROXIDE 400 MG/1
30 TABLET, CHEWABLE ORAL DAILY
Refills: 0 | Status: DISCONTINUED | OUTPATIENT
Start: 2024-03-21 | End: 2024-03-25

## 2024-03-20 RX ORDER — SODIUM CHLORIDE 9 MG/ML
1000 INJECTION, SOLUTION INTRAVENOUS
Refills: 0 | Status: DISCONTINUED | OUTPATIENT
Start: 2024-03-21 | End: 2024-03-23

## 2024-03-21 ENCOUNTER — APPOINTMENT (OUTPATIENT)
Dept: ORTHOPEDIC SURGERY | Facility: HOSPITAL | Age: 69
End: 2024-03-21

## 2024-03-21 ENCOUNTER — TRANSCRIPTION ENCOUNTER (OUTPATIENT)
Age: 69
End: 2024-03-21

## 2024-03-21 ENCOUNTER — INPATIENT (INPATIENT)
Facility: HOSPITAL | Age: 69
LOS: 3 days | Discharge: HOME | DRG: 470 | End: 2024-03-25
Attending: ORTHOPAEDIC SURGERY | Admitting: ORTHOPAEDIC SURGERY
Payer: COMMERCIAL

## 2024-03-21 VITALS
SYSTOLIC BLOOD PRESSURE: 124 MMHG | OXYGEN SATURATION: 99 % | HEART RATE: 78 BPM | TEMPERATURE: 97 F | RESPIRATION RATE: 16 BRPM | HEIGHT: 63 IN | WEIGHT: 146.83 LBS | DIASTOLIC BLOOD PRESSURE: 66 MMHG

## 2024-03-21 DIAGNOSIS — Z98.890 OTHER SPECIFIED POSTPROCEDURAL STATES: Chronic | ICD-10-CM

## 2024-03-21 DIAGNOSIS — K62.3 RECTAL PROLAPSE: Chronic | ICD-10-CM

## 2024-03-21 DIAGNOSIS — M16.12 UNILATERAL PRIMARY OSTEOARTHRITIS, LEFT HIP: ICD-10-CM

## 2024-03-21 DIAGNOSIS — N81.81 PERINEOCELE: Chronic | ICD-10-CM

## 2024-03-21 LAB
HCT VFR BLD CALC: 26.8 % — LOW (ref 34.5–45)
HCT VFR BLD CALC: 30.3 % — LOW (ref 34.5–45)
HCT VFR BLD CALC: 31 % — LOW (ref 34.5–45)
HGB BLD-MCNC: 10.3 G/DL — LOW (ref 11.5–15.5)
HGB BLD-MCNC: 10.3 G/DL — LOW (ref 11.5–15.5)
HGB BLD-MCNC: 8.9 G/DL — LOW (ref 11.5–15.5)
MCHC RBC-ENTMCNC: 30.4 PG — SIGNIFICANT CHANGE UP (ref 27–34)
MCHC RBC-ENTMCNC: 30.5 PG — SIGNIFICANT CHANGE UP (ref 27–34)
MCHC RBC-ENTMCNC: 30.8 PG — SIGNIFICANT CHANGE UP (ref 27–34)
MCHC RBC-ENTMCNC: 33.2 GM/DL — SIGNIFICANT CHANGE UP (ref 32–36)
MCHC RBC-ENTMCNC: 33.2 GM/DL — SIGNIFICANT CHANGE UP (ref 32–36)
MCHC RBC-ENTMCNC: 34 GM/DL — SIGNIFICANT CHANGE UP (ref 32–36)
MCV RBC AUTO: 89.4 FL — SIGNIFICANT CHANGE UP (ref 80–100)
MCV RBC AUTO: 91.8 FL — SIGNIFICANT CHANGE UP (ref 80–100)
MCV RBC AUTO: 92.8 FL — SIGNIFICANT CHANGE UP (ref 80–100)
NRBC # BLD: 0 /100 WBCS — SIGNIFICANT CHANGE UP (ref 0–0)
PLATELET # BLD AUTO: 176 K/UL — SIGNIFICANT CHANGE UP (ref 150–400)
PLATELET # BLD AUTO: 183 K/UL — SIGNIFICANT CHANGE UP (ref 150–400)
PLATELET # BLD AUTO: 210 K/UL — SIGNIFICANT CHANGE UP (ref 150–400)
RBC # BLD: 2.92 M/UL — LOW (ref 3.8–5.2)
RBC # BLD: 3.34 M/UL — LOW (ref 3.8–5.2)
RBC # BLD: 3.39 M/UL — LOW (ref 3.8–5.2)
RBC # FLD: 12.3 % — SIGNIFICANT CHANGE UP (ref 10.3–14.5)
RBC # FLD: 12.6 % — SIGNIFICANT CHANGE UP (ref 10.3–14.5)
RBC # FLD: 13.2 % — SIGNIFICANT CHANGE UP (ref 10.3–14.5)
WBC # BLD: 11.31 K/UL — HIGH (ref 3.8–10.5)
WBC # BLD: 11.36 K/UL — HIGH (ref 3.8–10.5)
WBC # BLD: 8.32 K/UL — SIGNIFICANT CHANGE UP (ref 3.8–10.5)
WBC # FLD AUTO: 11.31 K/UL — HIGH (ref 3.8–10.5)
WBC # FLD AUTO: 11.36 K/UL — HIGH (ref 3.8–10.5)
WBC # FLD AUTO: 8.32 K/UL — SIGNIFICANT CHANGE UP (ref 3.8–10.5)

## 2024-03-21 PROCEDURE — 93005 ELECTROCARDIOGRAM TRACING: CPT

## 2024-03-21 PROCEDURE — 99222 1ST HOSP IP/OBS MODERATE 55: CPT

## 2024-03-21 PROCEDURE — 27130 TOTAL HIP ARTHROPLASTY: CPT | Mod: AS,LT

## 2024-03-21 PROCEDURE — G0463: CPT

## 2024-03-21 PROCEDURE — 27130 TOTAL HIP ARTHROPLASTY: CPT | Mod: LT

## 2024-03-21 PROCEDURE — 86923 COMPATIBILITY TEST ELECTRIC: CPT

## 2024-03-21 DEVICE — LIGATING CLIPS WECK HORIZON MEDIUM-LARGE (GREEN) 6: Type: IMPLANTABLE DEVICE | Site: LEFT | Status: FUNCTIONAL

## 2024-03-21 DEVICE — SCREW ACET CANC PINN 6.5X20MM: Type: IMPLANTABLE DEVICE | Site: LEFT | Status: FUNCTIONAL

## 2024-03-21 DEVICE — IMPLANTABLE DEVICE: Type: IMPLANTABLE DEVICE | Site: LEFT | Status: FUNCTIONAL

## 2024-03-21 DEVICE — BONE WAX 2.5GM: Type: IMPLANTABLE DEVICE | Site: LEFT | Status: FUNCTIONAL

## 2024-03-21 DEVICE — LINER ACETABULAR ALTRX: Type: IMPLANTABLE DEVICE | Site: LEFT | Status: FUNCTIONAL

## 2024-03-21 DEVICE — HEAD FEM CEM TAPR PLUS 1.5MM 12/14X36MM: Type: IMPLANTABLE DEVICE | Site: LEFT | Status: FUNCTIONAL

## 2024-03-21 DEVICE — SHELL ACET PINNACLE W/ GRIPTION 52MM: Type: IMPLANTABLE DEVICE | Site: LEFT | Status: FUNCTIONAL

## 2024-03-21 DEVICE — LIGATING CLIPS WECK HORIZON MEDIUM (BLUE) 6: Type: IMPLANTABLE DEVICE | Site: LEFT | Status: FUNCTIONAL

## 2024-03-21 DEVICE — LIGATING CLIPS WECK HORIZON LARGE (ORANGE) 6: Type: IMPLANTABLE DEVICE | Site: LEFT | Status: FUNCTIONAL

## 2024-03-21 DEVICE — CABLE CABLE-RDY PLT TROCH 1.8X635: Type: IMPLANTABLE DEVICE | Site: LEFT | Status: FUNCTIONAL

## 2024-03-21 DEVICE — SCREW ACET CANC PINN 6.5X25MM: Type: IMPLANTABLE DEVICE | Site: LEFT | Status: FUNCTIONAL

## 2024-03-21 RX ORDER — ONDANSETRON 8 MG/1
4 TABLET, FILM COATED ORAL ONCE
Refills: 0 | Status: DISCONTINUED | OUTPATIENT
Start: 2024-03-21 | End: 2024-03-21

## 2024-03-21 RX ORDER — DOCUSATE SODIUM 100 MG
1 CAPSULE ORAL
Refills: 0 | DISCHARGE

## 2024-03-21 RX ORDER — GABAPENTIN 400 MG/1
100 CAPSULE ORAL EVERY 8 HOURS
Refills: 0 | Status: DISCONTINUED | OUTPATIENT
Start: 2024-03-21 | End: 2024-03-25

## 2024-03-21 RX ORDER — APREPITANT 80 MG/1
40 CAPSULE ORAL ONCE
Refills: 0 | Status: COMPLETED | OUTPATIENT
Start: 2024-03-21 | End: 2024-03-21

## 2024-03-21 RX ORDER — CELECOXIB 200 MG/1
1 CAPSULE ORAL
Refills: 0 | DISCHARGE

## 2024-03-21 RX ORDER — POLYETHYLENE GLYCOL 3350 17 G/17G
17 POWDER, FOR SOLUTION ORAL
Qty: 0 | Refills: 0 | DISCHARGE
Start: 2024-03-21

## 2024-03-21 RX ORDER — TRANEXAMIC ACID 100 MG/ML
1000 INJECTION, SOLUTION INTRAVENOUS ONCE
Refills: 0 | Status: COMPLETED | OUTPATIENT
Start: 2024-03-21 | End: 2024-03-21

## 2024-03-21 RX ORDER — SENNA PLUS 8.6 MG/1
2 TABLET ORAL
Qty: 0 | Refills: 0 | DISCHARGE
Start: 2024-03-21

## 2024-03-21 RX ORDER — ACETAMINOPHEN 500 MG
1000 TABLET ORAL ONCE
Refills: 0 | Status: COMPLETED | OUTPATIENT
Start: 2024-03-21 | End: 2024-03-21

## 2024-03-21 RX ORDER — SODIUM CHLORIDE 9 MG/ML
1000 INJECTION, SOLUTION INTRAVENOUS
Refills: 0 | Status: DISCONTINUED | OUTPATIENT
Start: 2024-03-21 | End: 2024-03-21

## 2024-03-21 RX ORDER — CEFAZOLIN SODIUM 1 G
2000 VIAL (EA) INJECTION EVERY 8 HOURS
Refills: 0 | Status: COMPLETED | OUTPATIENT
Start: 2024-03-21 | End: 2024-03-22

## 2024-03-21 RX ORDER — LANOLIN ALCOHOL/MO/W.PET/CERES
5 CREAM (GRAM) TOPICAL AT BEDTIME
Refills: 0 | Status: DISCONTINUED | OUTPATIENT
Start: 2024-03-21 | End: 2024-03-23

## 2024-03-21 RX ORDER — HYDROMORPHONE HYDROCHLORIDE 2 MG/ML
0.2 INJECTION INTRAMUSCULAR; INTRAVENOUS; SUBCUTANEOUS
Refills: 0 | Status: DISCONTINUED | OUTPATIENT
Start: 2024-03-21 | End: 2024-03-21

## 2024-03-21 RX ORDER — CELECOXIB 200 MG/1
1 CAPSULE ORAL
Qty: 0 | Refills: 0 | DISCHARGE
Start: 2024-03-21

## 2024-03-21 RX ORDER — ACETAMINOPHEN 500 MG
2 TABLET ORAL
Refills: 0 | DISCHARGE

## 2024-03-21 RX ORDER — HYDROMORPHONE HYDROCHLORIDE 2 MG/ML
0.5 INJECTION INTRAMUSCULAR; INTRAVENOUS; SUBCUTANEOUS
Refills: 0 | Status: DISCONTINUED | OUTPATIENT
Start: 2024-03-21 | End: 2024-03-21

## 2024-03-21 RX ORDER — CEFAZOLIN SODIUM 1 G
2000 VIAL (EA) INJECTION ONCE
Refills: 0 | Status: COMPLETED | OUTPATIENT
Start: 2024-03-21 | End: 2024-03-21

## 2024-03-21 RX ORDER — SODIUM CHLORIDE 9 MG/ML
500 INJECTION INTRAMUSCULAR; INTRAVENOUS; SUBCUTANEOUS ONCE
Refills: 0 | Status: COMPLETED | OUTPATIENT
Start: 2024-03-21 | End: 2024-03-21

## 2024-03-21 RX ORDER — GABAPENTIN 400 MG/1
1 CAPSULE ORAL
Qty: 0 | Refills: 0 | DISCHARGE
Start: 2024-03-21

## 2024-03-21 RX ORDER — DEXAMETHASONE 0.5 MG/5ML
8 ELIXIR ORAL ONCE
Refills: 0 | Status: COMPLETED | OUTPATIENT
Start: 2024-03-22 | End: 2024-03-22

## 2024-03-21 RX ORDER — ACETAMINOPHEN 500 MG
2 TABLET ORAL
Qty: 0 | Refills: 0 | DISCHARGE
Start: 2024-03-21

## 2024-03-21 RX ADMIN — Medication 1000 MILLIGRAM(S): at 21:59

## 2024-03-21 RX ADMIN — HYDROMORPHONE HYDROCHLORIDE 0.2 MILLIGRAM(S): 2 INJECTION INTRAMUSCULAR; INTRAVENOUS; SUBCUTANEOUS at 16:30

## 2024-03-21 RX ADMIN — HYDROMORPHONE HYDROCHLORIDE 0.2 MILLIGRAM(S): 2 INJECTION INTRAMUSCULAR; INTRAVENOUS; SUBCUTANEOUS at 16:13

## 2024-03-21 RX ADMIN — GABAPENTIN 100 MILLIGRAM(S): 400 CAPSULE ORAL at 20:13

## 2024-03-21 RX ADMIN — SENNA PLUS 2 TABLET(S): 8.6 TABLET ORAL at 21:52

## 2024-03-21 RX ADMIN — Medication 1000 MILLIGRAM(S): at 15:31

## 2024-03-21 RX ADMIN — HYDROMORPHONE HYDROCHLORIDE 0.5 MILLIGRAM(S): 2 INJECTION INTRAMUSCULAR; INTRAVENOUS; SUBCUTANEOUS at 19:15

## 2024-03-21 RX ADMIN — CELECOXIB 200 MILLIGRAM(S): 200 CAPSULE ORAL at 20:13

## 2024-03-21 RX ADMIN — Medication 1000 MILLIGRAM(S): at 22:45

## 2024-03-21 RX ADMIN — CELECOXIB 200 MILLIGRAM(S): 200 CAPSULE ORAL at 20:32

## 2024-03-21 RX ADMIN — SODIUM CHLORIDE 75 MILLILITER(S): 9 INJECTION, SOLUTION INTRAVENOUS at 13:59

## 2024-03-21 RX ADMIN — OXYCODONE HYDROCHLORIDE 5 MILLIGRAM(S): 5 TABLET ORAL at 20:14

## 2024-03-21 RX ADMIN — SODIUM CHLORIDE 125 MILLILITER(S): 9 INJECTION, SOLUTION INTRAVENOUS at 21:51

## 2024-03-21 RX ADMIN — Medication 400 MILLIGRAM(S): at 15:14

## 2024-03-21 RX ADMIN — SODIUM CHLORIDE 500 MILLILITER(S): 9 INJECTION INTRAMUSCULAR; INTRAVENOUS; SUBCUTANEOUS at 13:59

## 2024-03-21 RX ADMIN — Medication 100 MILLIGRAM(S): at 15:32

## 2024-03-21 RX ADMIN — SODIUM CHLORIDE 500 MILLILITER(S): 9 INJECTION INTRAMUSCULAR; INTRAVENOUS; SUBCUTANEOUS at 19:01

## 2024-03-21 RX ADMIN — Medication 5 MILLIGRAM(S): at 21:52

## 2024-03-21 RX ADMIN — HYDROMORPHONE HYDROCHLORIDE 0.5 MILLIGRAM(S): 2 INJECTION INTRAMUSCULAR; INTRAVENOUS; SUBCUTANEOUS at 19:00

## 2024-03-21 RX ADMIN — APREPITANT 40 MILLIGRAM(S): 80 CAPSULE ORAL at 06:56

## 2024-03-21 RX ADMIN — OXYCODONE HYDROCHLORIDE 5 MILLIGRAM(S): 5 TABLET ORAL at 21:14

## 2024-03-21 NOTE — PHYSICAL THERAPY INITIAL EVALUATION ADULT - GAIT TRAINING, PT EVAL
Patient will ambulate 150 feet independently with a rolling walker within 1-2 days for safe community ambulation. Patient will ascend/descend 13 stairs independently with +HR and straight cane within 1-2 days for safe household stair negotiation.

## 2024-03-21 NOTE — PHYSICAL THERAPY INITIAL EVALUATION ADULT - ADDITIONAL COMMENTS
Pt lives alone in an apartment, there are 13 stairs +HR to enter and no stairs to negotiate within. Pt has a walk in shower. PTA pt was independent with ADLs and ambulation. Pt owns a RW. Pt reports she has no one to assist at home upon dc.

## 2024-03-21 NOTE — DISCHARGE NOTE PROVIDER - NSDCMRMEDTOKEN_GEN_ALL_CORE_FT
acetaminophen 500 mg oral tablet: 2 tab(s) orally as needed for prn  CeleBREX 200 mg oral capsule: 1 cap(s) orally as needed for prn  Colace 100 mg oral capsule: 1 cap(s) orally as needed for prn  Melatonin 5 mg oral tablet: 1 tab(s) orally   acetaminophen 500 mg oral tablet: 2 tab(s) orally every 8 hours  celecoxib 200 mg oral capsule: 1 cap(s) orally every 12 hours  Colace 100 mg oral capsule: 1 cap(s) orally as needed for prn  gabapentin 100 mg oral capsule: 1 cap(s) orally every 8 hours  Melatonin 5 mg oral tablet: 1 tab(s) orally  polyethylene glycol 3350 oral powder for reconstitution: 17 gram(s) orally once a day (at bedtime)  senna leaf extract oral tablet: 2 tab(s) orally once a day (at bedtime)   acetaminophen 500 mg oral tablet: 2 tab(s) orally every 8 hours  apixaban 2.5 mg oral tablet: 1 tab(s) orally every 12 hours  celecoxib 200 mg oral capsule: 1 cap(s) orally every 12 hours  Colace 100 mg oral capsule: 1 cap(s) orally as needed for prn  gabapentin 100 mg oral capsule: 1 cap(s) orally every 8 hours  Melatonin 5 mg oral tablet: 1 tab(s) orally once a day (at bedtime) as needed for  insomnia  omeprazole 20 mg oral delayed release capsule: 1 cap(s) orally once a day before breakfast  oxyCODONE 5 mg oral tablet: 1 tab(s) orally every 4 hours as needed for  Moderate Pain or 2 tabs every 4 hrs as needed for severe pain    [id number] MDD: 6  polyethylene glycol 3350 oral powder for reconstitution: 17 gram(s) orally once a day (at bedtime)  senna leaf extract oral tablet: 2 tab(s) orally once a day (at bedtime)   acetaminophen 500 mg oral tablet: 2 tab(s) orally every 8 hours  apixaban 2.5 mg oral tablet: 1 tab(s) orally every 12 hours prevention of blood clots MDD: 2  celecoxib 200 mg oral capsule: 1 cap(s) orally every 12 hours for pain and  ho prophylaxis MDD: 2  Colace 100 mg oral capsule: 1 cap(s) orally as needed for prn  gabapentin 100 mg oral capsule: 1 cap(s) orally every 8 hours per surgeon  Narcan 4 mg/0.1 mL nasal spray: 4 milligram(s) intranasally once as needed for overdose MDD: 1  omeprazole 20 mg oral delayed release capsule: 1 cap(s) orally once a day before breakfast MDD: 1  oxyCODONE 5 mg oral tablet: 0.5 tab(s) orally every 4 hours as needed for  Moderate Pain take one half tablet as needed for moderate pain  1 tablet for severe pain MDD: 6  polyethylene glycol 3350 oral powder for reconstitution: 17 gram(s) orally once a day (at bedtime)  senna leaf extract oral tablet: 2 tab(s) orally once a day (at bedtime)

## 2024-03-21 NOTE — DISCHARGE NOTE PROVIDER - NSDCFUADDAPPT_GEN_ALL_CORE_FT
Please follow up w/ Dr James at [address] [place] [address] office  It is advisable to follow up w/ your pcp in 2-3 weeks to be sure there are no underlying problems.   Please follow up w/ Dr James at [address] [place] [address]  It is advisable to follow up w/ your pcp in 2-3 weeks to be sure there are no underlying problems.  - Call your doctor if you experience:  • An increase in pain not controlled by pain medication or change in activity or  position.  • Temperature greater than 101° F.  • Redness, increased swelling or foul smelling drainage from or around the  incision.  • Numbness, tingling or a change in color or temperature of the operative leg.  • Call your doctor immediately if you experience chest pain, shortness of breath or calf pain.

## 2024-03-21 NOTE — CONSULT NOTE ADULT - ASSESSMENT
69yo F w/ PMH of anxiety, hx of genital prolapse, hx of nephrolithiasis, OA, presents with left hip pain due to long-standing OA, for elective left total hip replacement now POD#0 s/p left ELMO.    #left hip OA   - s/p left ELMO POD#0  - prophylactic Abx with Ancef karlene-operatively  - dexamethasone 8mg IV x1 dose tomorrow morning per ortho protocol   - c/w multimodal pain management w/ Tylenol and celecoxib standing and oxycodone / dilaudid PRN  - bowel regimen  - VTE ppx with SCDs and planned for Eliquis 2.5mg po BID per ortho protocol to start at 9AM tomorrow -- please start Eliquis after confirming morning H&H has not dropped significantly and that pt is hemodynamically stable   - physical therapy eval  - monitor H&H -- will repeat this evening at 8PM    #acute blood loss anemia  - due to intra- and post-operative bleeding  - Hgb down from 13.3 on pre-surgical testing to 8.9 and pt given 2un PRBC (one during operation and one in the PACU)  - repeat CBC at 8PM - gave signout to night hospitalist to f/up  - monitor VS  - pt denies symptoms from anemia currently   - ortho protocol is to start Eliquis at 9AM on POD#1 --- discussed with ortho PA -- given Eliquis is not easily reversible, rec to start Eliquis only after confirming the morning H&H has not dropped significantly and pt is hemodynamically stable     #Neuropathy  - pt reports chronic neuropathy in hands and states she had been told she had carpal tunnel syndrome in the past but not pursued treatment  - being started on gabapentin  - will check B12  - may need to pursue neuro eval and possibly c-spine imaging in the near future    #Anxiety  - pt states she is not on any medications    #VTE ppx  - SCDs today

## 2024-03-21 NOTE — DISCHARGE NOTE PROVIDER - NSDCCPCAREPLAN_GEN_ALL_CORE_FT
PRINCIPAL DISCHARGE DIAGNOSIS  Diagnosis: Primary localized osteoarthritis of hip  Assessment and Plan of Treatment: left anterior ELMO  Physical Therapy/Occupational Therapy for: Ambulation, transfers, stairs, & ADLs, isometrics.   Full weight bearing on both legs; Walker/cane use as instructed by Physical therapy/Occupational therapy. Anterior Total Hip Replacement precautions for  6 weeks: No straight leg raise; No external rotation of hip when extended-standing or lying flat; No hyperextension of hip when standing (kickback).   Ice packs to hip for 30 min. every 3 hours and after physical therapy.   Keep incision clean and dry. You have a silverlon dressing. It is water resistant and may get slightly wet in the shower.  Remove dressing in 7 days and leave wound open to air.  Wound may get wet in the shower as long as there is no drainage from wound.  • Do not take a tub bath yet.   • Do not resume driving until you have your surgeon’s permission.  Sutures will be removed postop day 12-14 by home care nurse.  Opioid safety : Do not keep opioid in the medicine cabinet in bathroom or on kitchen counter where others may have access to it.  Do not drive while taking opioid.  To dispose of it some pharmacies do have drop boxes as do police stations.  Do not flush or put in trash.

## 2024-03-21 NOTE — DISCHARGE NOTE PROVIDER - NSDCFUSCHEDAPPT_GEN_ALL_CORE_FT
[facility]  ORTHOSURG [address] [place] [address]  Scheduled Appointment: 04/08/2024

## 2024-03-21 NOTE — DISCHARGE NOTE PROVIDER - CARE PROVIDER_API CALL
[facility]  Orthopaedic Surgery  [address], [address]  [place] [address]  Phone: [phone]  Fax: [phone]  Established Patient  Scheduled Appointment: 04/08/2024

## 2024-03-21 NOTE — DISCHARGE NOTE PROVIDER - INSTRUCTIONS
For Constipation :   • Increase your water intake. Drink at least 8 glasses of water daily.  • Try adding fiber to your diet by eating fruits, vegetables and foods that are rich in grains.  • If you do experience constipation, you may take an over-the-counter stool softener/laxative such as Stephanie Colace, Senekot, miralax or  Milk of Magnesia.

## 2024-03-21 NOTE — DISCHARGE NOTE PROVIDER - HOSPITAL COURSE
The patient is a 68 y.o. female with severe osteoarthritis of the left  hip.  She  was evaluated by the PST dept at [facility] and obtained the appropriate medical clearances.  On the day prior to surgery she received preop radiation for heterotopic ossification prophylaxis.  After admission on 3/21/24 and receiving pre-operative parenteral prophylactic antibiotics, the patient  underwent an  uncomplicated left anterior ELMO by orthopedic surgeon Dr. Hernando James.    A medical consultation from the Hospitalist service was obtained for post-operative medical co-management. Typical Physical & occupational therapy modalities post anterior ELMO were performed including ambulation training, range of motion, ADL's, and transfers. Ecotrin 81 mg every 12 hrs, along w. bilat venodynes was given for DVT prophylaxis (caprini 8).  The patient had a clean appearing surgical incision with no sign of surgical site infections and had a stable neuro / vascular exam of the operated extremity.  After progression of mobility guided by the PT/ OT staff,  the patient was felt to benefit from further rehabilitative care for restoration to level of function. This was felt to best be accomplished at  ________________.  Discharge and Orthopedic Care instructions were delineated in the Discharge Plan and reviewed with the patient. All medications were delineated in the medication reconciliation tool and key points were reviewed with the patient. They were deemed stable from an Orthopedic & medical standpoint for discharge today.  Upon (completion of Home care she will be  following up with Dr. James for office  follow up Orthopedic care.   The patient is a 68 y.o. female with severe osteoarthritis of the left  hip.  She  was evaluated by the PST dept at [facility] and obtained the appropriate medical clearances.  On the day prior to surgery she received preop radiation for heterotopic ossification prophylaxis.  After admission on 3/21/24 and receiving pre-operative parenteral prophylactic antibiotics, the patient  underwent an  uncomplicated left anterior ELMO by orthopedic surgeon Dr. Hernando James.  Due to blood loss, she received one unit PRBCs in the OR and an additional unit in PACU.  A medical consultation from the Hospitalist service was obtained for post-operative medical co-management. Typical Physical & occupational therapy modalities post anterior ELMO were performed including ambulation training, range of motion, ADL's, and transfers. Ecotrin 81 mg every 12 hrs, along w. bilat venodynes was given for DVT prophylaxis (caprini 8).  The patient had a clean appearing surgical incision with no sign of surgical site infections and had a stable neuro / vascular exam of the operated extremity.  After progression of mobility guided by the PT/ OT staff,  the patient was felt to benefit from further rehabilitative care for restoration to level of function. This was felt to best be accomplished at  ________________.  Discharge and Orthopedic Care instructions were delineated in the Discharge Plan and reviewed with the patient. All medications were delineated in the medication reconciliation tool and key points were reviewed with the patient. They were deemed stable from an Orthopedic & medical standpoint for discharge today.  Upon (completion of Home care she will be  following up with Dr. James for office  follow up Orthopedic care.   The patient is a 68 y.o. female with severe osteoarthritis of the left  hip.  She  was evaluated by the PST dept at [facility] and obtained the appropriate medical clearances.  On the day prior to surgery she received preop radiation for heterotopic ossification prophylaxis.  After admission on 3/21/24 and receiving pre-operative parenteral prophylactic antibiotics, the patient  underwent an  uncomplicated left anterior ELMO by orthopedic surgeon Dr. Hernando James.  Due to blood loss, she received one unit PRBCs in the OR and an additional unit in PACU.  A medical consultation from the Hospitalist service was obtained for post-operative medical co-management. Typical Physical & occupational therapy modalities post anterior ELMO were performed including ambulation training, range of motion, ADL's, and transfers. Ecotrin 81 mg every 12 hrs, along w. bilat venodynes was given for DVT prophylaxis (caprini 8).  The patient had a clean appearing surgical incision with no sign of surgical site infections and had a stable neuro / vascular exam of the operated extremity.  After progression of mobility guided by the PT/ OT staff,  the patient was felt to benefit from further rehabilitative care for restoration to level of function. This was felt to best be accomplished at  home w/ home care (skilled RN/PT/OT).    Discharge and Orthopedic Care instructions were delineated in the Discharge Plan and reviewed with the patient. All medications were delineated in the medication reconciliation tool and key points were reviewed with the patient. They were deemed stable from an Orthopedic & medical standpoint for discharge today.  Upon completion of Home care she will be  following up with Dr. James for office  follow up Orthopedic care.   The patient is a 68 y.o. female with severe osteoarthritis of the left  hip.  She  was evaluated by the PST dept at [facility] and obtained the appropriate medical clearances.  On the day prior to surgery she received preop radiation for heterotopic ossification prophylaxis.  After admission on 3/21/24 and receiving pre-operative parenteral prophylactic antibiotics, the patient  underwent an  uncomplicated left anterior ELMO by orthopedic surgeon Dr. Hernando James.  Due to blood loss, she received one unit PRBCs in the OR and an additional unit in PACU.  A medical consultation from the Hospitalist service was obtained for post-operative medical co-management. Typical Physical & occupational therapy modalities post anterior ELMO were performed including ambulation training, range of motion, ADL's, and transfers. Ecotrin 81 mg every 12 hrs, along w. bilat venodynes was given for DVT prophylaxis (caprini 8).  The patient had a clean appearing surgical incision with no sign of surgical site infections and had a stable neuro / vascular exam of the operated extremity.  After progression of mobility guided by the PT/ OT staff,  the patient was felt to benefit from further rehabilitative care for restoration to level of function. This was felt to best be accomplished at  ___________.    Discharge and Orthopedic Care instructions were delineated in the Discharge Plan and reviewed with the patient. All medications were delineated in the medication reconciliation tool and key points were reviewed with the patient. They were deemed stable from an Orthopedic & medical standpoint for discharge today.  Upon (discharge from rehab) or (completion of Home care) she will be  following up with Dr. James for office  follow up Orthopedic care.   The patient is a 68 y.o. female with severe osteoarthritis of the left  hip.  She  was evaluated by the PST dept at [facility] and obtained the appropriate medical clearances.  On the day prior to surgery she received preop radiation for heterotopic ossification prophylaxis.  After admission on 3/21/24 and receiving pre-operative parenteral prophylactic antibiotics, the patient  underwent an  uncomplicated left anterior ELMO by orthopedic surgeon Dr. Hernando James.  Due to blood loss, she received one unit PRBCs in the OR and an additional unit in PACU.  A medical consultation from the Hospitalist service was obtained for post-operative medical co-management. Typical Physical & occupational therapy modalities post anterior ELMO were performed including ambulation training, range of motion, ADL's, and transfers. Ecotrin 81 mg every 12 hrs, along w. bilat venodynes was given for DVT prophylaxis (caprini 8).  The patient had a clean appearing surgical incision with no sign of surgical site infections and had a stable neuro / vascular exam of the operated extremity.  After progression of mobility guided by the PT/ OT staff,  the patient was felt to benefit from further rehabilitative care for restoration to level of function. This was felt to best be accomplished at  Rehab.    Discharge and Orthopedic Care instructions were delineated in the Discharge Plan and reviewed with the patient. All medications were delineated in the medication reconciliation tool and key points were reviewed with the patient. They were deemed stable from an Orthopedic & medical standpoint for discharge today.  Upon (discharge from rehab) or (completion of Home care) she will be  following up with Dr. James for office  follow up Orthopedic care.   The patient is a 68 y.o. female with severe osteoarthritis of the left  hip.  She  was evaluated by the PST dept at [facility] and obtained the appropriate medical clearances.  On the day prior to surgery she received preop radiation for heterotopic ossification prophylaxis.  After admission on 3/21/24 and receiving pre-operative parenteral prophylactic antibiotics, the patient  underwent an  uncomplicated left anterior ELMO by orthopedic surgeon Dr. Hernando James.  Due to blood loss, she received one unit PRBCs in the OR and an additional unit in PACU.  A medical consultation from the Hospitalist service was obtained for post-operative medical co-management. Typical Physical & occupational therapy modalities post anterior ELMO were performed including ambulation training, range of motion, ADL's, and transfers. Ecotrin 81 mg every 12 hrs, along w. bilat venodynes was given for DVT prophylaxis (caprini 8).  The patient had a clean appearing surgical incision with no sign of surgical site infections and had a stable neuro / vascular exam of the operated extremity.  After progression of mobility guided by the PT/ OT staff,  the patient was felt to benefit from further rehabilitative care for restoration to level of function. This was felt to best be accomplished at  home..    Discharge and Orthopedic Care instructions were delineated in the Discharge Plan and reviewed with the patient. All medications were delineated in the medication reconciliation tool and key points were reviewed with the patient. They were deemed stable from an Orthopedic & medical standpoint for discharge today.  Upon completion of Home care she will be  following up with Dr. James for office  follow up Orthopedic care.

## 2024-03-21 NOTE — CONSULT NOTE ADULT - SUBJECTIVE AND OBJECTIVE BOX
Patient is a 68y old  Female who presents with a chief complaint of left hip pain--for elective left ELMO.      HPI:  69yo F w/ PMH of anxiety, hx of genital prolapse, hx of nephrolithiasis, OA, presents with left hip pain due to long-standing OA, for elective left total hip replacement. Patient had pain that was severe at night and failed conservative therapy as outpatient. Thus, pt is now being admitted for elective ELMO.    PAST MEDICAL & SURGICAL HISTORY:  Female cystocele  Rectocele  Stress incontinence, female  Acute anxiety  Acute depression  Fecal incontinence  Genital prolapse  Kidney stone on right side  Microhematuria  Nocturia  Renal cyst  Vitamin D deficiency  History of multiple miscarriages  Vaginal delivery  2019 novel coronavirus disease (COVID-19)  Osteoarthritis of left hip  Osteoarthritis  H/O breast biopsy  H/O dilation and curettage  Rectal prolapse  Perineocele  Status post creation of urethral sling by suprapubic approach    Home Meds:  · acetaminophen 500 mg oral tablet: Last Dose Taken:  , 2 tab(s) orally as needed for prn  · Colace 100 mg oral capsule: Last Dose Taken:  , 1 cap(s) orally as needed for prn  · Melatonin 5 mg oral tablet: Last Dose Taken:  , 1 tab(s) orally  · CeleBREX 200 mg oral capsule: Last Dose Taken:  , 1 cap(s) orally as needed for prn    Allergies  No Known Allergies or Intolerances      Hospital Meds:  MEDICATIONS  (STANDING):  acetaminophen     Tablet .. 1000 milliGRAM(s) Oral every 8 hours  ceFAZolin   IVPB 2000 milliGRAM(s) IV Intermittent every 8 hours  celecoxib 200 milliGRAM(s) Oral every 12 hours  chlorhexidine 2% Cloths 1 Application(s) Topical once  gabapentin 100 milliGRAM(s) Oral every 8 hours  lactated ringers. 1000 milliLiter(s) (125 mL/Hr) IV Continuous <Continuous>  melatonin 5 milliGRAM(s) Oral at bedtime  pantoprazole    Tablet 40 milliGRAM(s) Oral before breakfast  polyethylene glycol 3350 17 Gram(s) Oral at bedtime  senna 2 Tablet(s) Oral at bedtime    MEDICATIONS  (PRN):  aluminum hydroxide/magnesium hydroxide/simethicone Suspension 30 milliLiter(s) Oral four times a day PRN Indigestion  HYDROmorphone  Injectable 0.5 milliGRAM(s) IV Push every 3 hours PRN Breakthrough pain  magnesium hydroxide Suspension 30 milliLiter(s) Oral daily PRN Constipation  ondansetron Injectable 4 milliGRAM(s) IV Push every 6 hours PRN Nausea and/or Vomiting  oxyCODONE    IR 5 milliGRAM(s) Oral every 3 hours PRN Moderate Pain (4 - 6)  oxyCODONE    IR 10 milliGRAM(s) Oral every 3 hours PRN Severe Pain (7 - 10)      SOCIAL HISTORY:  Never smoker. Social EtOH - drinks 1-2 beers per month. Denies illicit drug use.    FAMILY HISTORY:  Family history of prostate cancer in father (Father)      REVIEW OF SYSTEMS:  General: no fever, chills  Eyes: no vision changes  ENT: no hearing changes, nasal congestion, or sore throat  CV: no chest pain or palpitations  Pulm: no SOB, wheezing, cough, or hemoptysis  Abd/GI: no nausea, vomiting, diarrhea, constipation, abd pain  : no dysuria, hematuria, urinary frequency  MSK: no joint pain or myalgias  Neuro: +chronic neuropathy in fingers of hands ; no syncope, dizziness, focal weakness  Psych: no anxiety or depression  Endo: no heat or cold intolerance      Vital Signs Last 24 Hrs  T(C): 36.3 (21 Mar 2024 17:08), Max: 37.2 (21 Mar 2024 11:15)  T(F): 97.4 (21 Mar 2024 17:08), Max: 98.9 (21 Mar 2024 11:15)  HR: 88 (21 Mar 2024 17:08) (78 - 93)  BP: 94/50 (21 Mar 2024 17:08) (85/34 - 124/66)  BP(mean): 58 (21 Mar 2024 14:30) (58 - 58)  RR: 15 (21 Mar 2024 16:15) (11 - 18)  SpO2: 98% (21 Mar 2024 17:08) (97% - 100%)    Parameters below as of 21 Mar 2024 16:00  Patient On (Oxygen Delivery Method): room air        PHYSICAL EXAM:  General: No apparent distress  Head: normocephalic, atraumatic  Eyes: EOMI, anicteric  ENT: moist mucous membranes, no pharyngeal exudates  Heart: rrr, S1, S2, no murmurs  Chest: CTA b/l, no rales, rhonchi, or wheezes  Abd: BS+, soft, NT, ND  Back: no CVA tenderness  Extr: left hip dressing C/D/I, drain in place with bloody drainage; no edema or cyanosis  Neuro: AA&Ox3, no focal weakness, sensation to light touch intact  Psych: normal affect      LABS:                        8.9    11.36 )-----------( 176      ( 21 Mar 2024 11:50 )             26.8               CAPILLARY BLOOD GLUCOSE          RADIOLOGY & ADDITIONAL STUDIES: Patient is a 68y old  Female who presents with a chief complaint of left hip pain--for elective left ELMO.      HPI:  69yo F w/ PMH of anxiety, hx of genital prolapse, hx of nephrolithiasis, OA, presents with left hip pain due to long-standing OA, for elective left total hip replacement. Patient had pain that was severe at night and failed conservative therapy as outpatient. Thus, pt is now being admitted for elective ELMO.    PAST MEDICAL & SURGICAL HISTORY:  Female cystocele  Rectocele  Stress incontinence, female  Acute anxiety  Acute depression  Fecal incontinence  Genital prolapse  Kidney stone on right side  Microhematuria  Nocturia  Renal cyst  Vitamin D deficiency  History of multiple miscarriages  Vaginal delivery  2019 novel coronavirus disease (COVID-19)  Osteoarthritis of left hip  Osteoarthritis  H/O breast biopsy  H/O dilation and curettage  Rectal prolapse  Perineocele  Status post creation of urethral sling by suprapubic approach    Home Meds:  · acetaminophen 500 mg oral tablet: Last Dose Taken:  , 2 tab(s) orally as needed for prn  · Colace 100 mg oral capsule: Last Dose Taken:  , 1 cap(s) orally as needed for prn  · Melatonin 5 mg oral tablet: Last Dose Taken:  , 1 tab(s) orally  · CeleBREX 200 mg oral capsule: Last Dose Taken:  , 1 cap(s) orally as needed for prn    Allergies  No Known Allergies or Intolerances       Hospital Meds:  MEDICATIONS  (STANDING):  acetaminophen     Tablet .. 1000 milliGRAM(s) Oral every 8 hours  ceFAZolin   IVPB 2000 milliGRAM(s) IV Intermittent every 8 hours  celecoxib 200 milliGRAM(s) Oral every 12 hours  chlorhexidine 2% Cloths 1 Application(s) Topical once  gabapentin 100 milliGRAM(s) Oral every 8 hours  lactated ringers. 1000 milliLiter(s) (125 mL/Hr) IV Continuous <Continuous>  melatonin 5 milliGRAM(s) Oral at bedtime  pantoprazole    Tablet 40 milliGRAM(s) Oral before breakfast  polyethylene glycol 3350 17 Gram(s) Oral at bedtime  senna 2 Tablet(s) Oral at bedtime    MEDICATIONS  (PRN):  aluminum hydroxide/magnesium hydroxide/simethicone Suspension 30 milliLiter(s) Oral four times a day PRN Indigestion  HYDROmorphone  Injectable 0.5 milliGRAM(s) IV Push every 3 hours PRN Breakthrough pain  magnesium hydroxide Suspension 30 milliLiter(s) Oral daily PRN Constipation  ondansetron Injectable 4 milliGRAM(s) IV Push every 6 hours PRN Nausea and/or Vomiting  oxyCODONE    IR 5 milliGRAM(s) Oral every 3 hours PRN Moderate Pain (4 - 6)  oxyCODONE    IR 10 milliGRAM(s) Oral every 3 hours PRN Severe Pain (7 - 10)      SOCIAL HISTORY:  Never smoker. Social EtOH - drinks 1-2 beers per month. Denies illicit drug use.    FAMILY HISTORY:  Family history of prostate cancer in father (Father)      REVIEW OF SYSTEMS:  General: no fever, chills  Eyes: no vision changes  ENT: no hearing changes, nasal congestion, or sore throat  CV: no chest pain or palpitations  Pulm: no SOB, wheezing, cough, or hemoptysis  Abd/GI: no nausea, vomiting, diarrhea, constipation, abd pain  : no dysuria, hematuria, urinary frequency  MSK: no joint pain or myalgias  Neuro: +chronic neuropathy in fingers of hands ; no syncope, dizziness, focal weakness  Psych: no anxiety or depression  Endo: no heat or cold intolerance      Vital Signs Last 24 Hrs  T(C): 36.3 (21 Mar 2024 17:08), Max: 37.2 (21 Mar 2024 11:15)  T(F): 97.4 (21 Mar 2024 17:08), Max: 98.9 (21 Mar 2024 11:15)  HR: 88 (21 Mar 2024 17:08) (78 - 93)  BP: 94/50 (21 Mar 2024 17:08) (85/34 - 124/66)  BP(mean): 58 (21 Mar 2024 14:30) (58 - 58)  RR: 15 (21 Mar 2024 16:15) (11 - 18)  SpO2: 98% (21 Mar 2024 17:08) (97% - 100%)    Parameters below as of 21 Mar 2024 16:00  Patient On (Oxygen Delivery Method): room air        PHYSICAL EXAM:  General: No apparent distress  Head: normocephalic, atraumatic  Eyes: EOMI, anicteric  ENT: moist mucous membranes, no pharyngeal exudates  Heart: rrr, S1, S2, no murmurs  Chest: CTA b/l, no rales, rhonchi, or wheezes  Abd: BS+, soft, NT, ND  Back: no CVA tenderness  Extr: left hip dressing C/D/I, drain in place with bloody drainage; no edema or cyanosis  Neuro: AA&Ox3, no focal weakness, sensation to light touch intact  Psych: normal affect      LABS:                        8.9    11.36 )-----------( 176      ( 21 Mar 2024 11:50 )             26.8               CAPILLARY BLOOD GLUCOSE          RADIOLOGY & ADDITIONAL STUDIES:

## 2024-03-21 NOTE — PROGRESS NOTE ADULT - SUBJECTIVE AND OBJECTIVE BOX
Pt seen in preop holding for med review.  on exam DP pulses 1+ bialt PT 2+ bilat, motor 5/5 sensation intact.

## 2024-03-21 NOTE — PHYSICAL THERAPY INITIAL EVALUATION ADULT - PERTINENT HX OF CURRENT PROBLEM, REHAB EVAL
Pt is a 69 y/o female with left hip primary OA. Pt is s/p left total hip replacement anterior approach on 3/21/24.

## 2024-03-21 NOTE — PATIENT PROFILE ADULT - FALL HARM RISK - UNIVERSAL INTERVENTIONS
Bed in lowest position, wheels locked, appropriate side rails in place/Call bell, personal items and telephone in reach/Instruct patient to call for assistance before getting out of bed or chair/Non-slip footwear when patient is out of bed/[place] to call system/Physically safe environment - no spills, clutter or unnecessary equipment/Purposeful Proactive Rounding/Room/bathroom lighting operational, light cord in reach

## 2024-03-21 NOTE — CONSULT NOTE ADULT - TIME BILLING
Note written by attending, see above.  Time spent: 56min. Time spent discussing case with orthopedic team, and with the patient on medical condition - OA s/p left ELMO, acute blood loss anemia, neuropathy, pain management and potential side effects of medications, signs&symptoms of VTE to be mindful of.

## 2024-03-21 NOTE — BRIEF OPERATIVE NOTE - VENOUS THROMBOEMBOLISM PROPHYLAXIS THERAPY
venodynes bilat then venodynes and ecotrin postop (caprini 8) venodynes bilat then venodynes and eliquis postop (caprini 10)

## 2024-03-21 NOTE — BRIEF OPERATIVE NOTE - COMMENTS
third assistant: GEOFFREY Diana (hana table)   implants: acet 52 mm pinnacle gription sector cup w/ 1 screw and 0 degree altrx liner, femur #6 standard offset emphasys collared stem w/ 36+1.5 biolox head.  prophylactic cabling of femur

## 2024-03-21 NOTE — PROGRESS NOTE ADULT - SUBJECTIVE AND OBJECTIVE BOX
POST OPERATIVE DAY #: o    68y Female  s/p  Left  Anterior THR                        SUBJECTIVE: Patient seen and examined at bedside. Pt c/o long standing bilateral hand " pins and needles" sensations, right hand worse than left.     Pain:  well controlled        Pain scale:  2 /10  Denies CP, SOB, N/V/D, weakness, numbness   No new complains     OBJECTIVE:     Vital Signs Last 24 Hrs  T(C): 36.3 (21 Mar 2024 17:08), Max: 37.2 (21 Mar 2024 11:15)  T(F): 97.4 (21 Mar 2024 17:08), Max: 98.9 (21 Mar 2024 11:15)  HR: 88 (21 Mar 2024 17:08) (78 - 93)  BP: 94/50 (21 Mar 2024 17:08) (85/34 - 124/66)  BP(mean): 58 (21 Mar 2024 14:30) (58 - 58)  RR: 15 (21 Mar 2024 16:15) (11 - 18)  SpO2: 98% (21 Mar 2024 17:08) (97% - 100%)    Parameters below as of 21 Mar 2024 16:00  Patient On (Oxygen Delivery Method): room air        Affected extremity: LLE         Dressing: aly,   clean/dry/intact                             Sensation: intact to light touch          Motor exam:   5/ 5 Tibialis Anterior/Gastrocnemius-Soleus, EHL/FHL         warm, well-perfused; capillary refill < 3 seconds         negative calf tenderness B/L LE    LABS:                        8.9    11.36 )-----------( 176      ( 21 Mar 2024 11:50 )             26.8             I&O's Detail    21 Mar 2024 07:01  -  21 Mar 2024 18:02  --------------------------------------------------------  IN:    Lactated Ringers: 2800 mL    PRBCs (Packed Red Blood Cells): 564 mL    Sodium Chloride 0.9% Bolus: 500 mL  Total IN: 3864 mL    OUT:    Accordian (mL): 100 mL    Blood Loss (mL): 1400 mL  Total OUT: 1500 mL    Total NET: 2364 mL          MEDICATIONS:  Infection prophylaxis:  ceFAZolin   IVPB 2000 milliGRAM(s) IV Intermittent every 8 hours    Pain management:  acetaminophen     Tablet .. 1000 milliGRAM(s) Oral every 8 hours  celecoxib 200 milliGRAM(s) Oral every 12 hours  gabapentin 100 milliGRAM(s) Oral every 8 hours  HYDROmorphone  Injectable 0.5 milliGRAM(s) IV Push every 3 hours PRN  melatonin 5 milliGRAM(s) Oral at bedtime  ondansetron Injectable 4 milliGRAM(s) IV Push every 6 hours PRN  oxyCODONE    IR 5 milliGRAM(s) Oral every 3 hours PRN  oxyCODONE    IR 10 milliGRAM(s) Oral every 3 hours PRN    DVT prophylaxis:  Eliquis      RADIOLOGY & ADDITIONAL STUDIES:    ASSESSMENT AND PLAN:   - B/L hands neuropathy. will start gabapentin, Pt will need outpatient work up.   - Analgesic pain control  - DVT prophylaxis:  Eliquis2.5mg twice a day    SCDs       - Antibiotics:  Ancef  for 24 hours   - HO prophylaxis/Pain Management: Celebrex 200mg twice a day x 21 days   - PT/OT: Weight Bearing Status:  Weight bearing as tolerated, OOBTC           Anterior Hip precautions        -  Incentive spirometry  - ice  - IVF  - Advance diet as tolerated  - Hospitalist is following  -  Follow up labs  -  Disposition: Home     vs [facility] pending PT progress tomorrow  POST OPERATIVE DAY #: o    68y Female  s/p  Left  Anterior THR                        SUBJECTIVE: Patient seen and examined at bedside. Pt c/o long standing bilateral hand " pins and needles" sensations, right hand worse than left.     Pain:  well controlled        Pain scale:  2 /10  Denies CP, SOB, N/V/D, weakness, numbness   No new complains     OBJECTIVE:     Vital Signs Last 24 Hrs  T(C): 36.3 (21 Mar 2024 17:08), Max: 37.2 (21 Mar 2024 11:15)  T(F): 97.4 (21 Mar 2024 17:08), Max: 98.9 (21 Mar 2024 11:15)  HR: 88 (21 Mar 2024 17:08) (78 - 93)  BP: 94/50 (21 Mar 2024 17:08) (85/34 - 124/66)  BP(mean): 58 (21 Mar 2024 14:30) (58 - 58)  RR: 15 (21 Mar 2024 16:15) (11 - 18)  SpO2: 98% (21 Mar 2024 17:08) (97% - 100%)    Parameters below as of 21 Mar 2024 16:00  Patient On (Oxygen Delivery Method): room air        Affected extremity: LLE      HV in place, self suction          Dressing: aly,   clean/dry/intact                             Sensation: intact to light touch          Motor exam:   5/ 5 Tibialis Anterior/Gastrocnemius-Soleus, EHL/FHL         warm, well-perfused; capillary refill < 3 seconds         negative calf tenderness B/L LE    LABS:                        8.9    11.36 )-----------( 176      ( 21 Mar 2024 11:50 )             26.8             I&O's Detail    21 Mar 2024 07:01  -  21 Mar 2024 18:02  --------------------------------------------------------  IN:    Lactated Ringers: 2800 mL    PRBCs (Packed Red Blood Cells): 564 mL    Sodium Chloride 0.9% Bolus: 500 mL  Total IN: 3864 mL    OUT:    Accordian (mL): 100 mL    Blood Loss (mL): 1400 mL  Total OUT: 1500 mL    Total NET: 2364 mL          MEDICATIONS:  Infection prophylaxis:  ceFAZolin   IVPB 2000 milliGRAM(s) IV Intermittent every 8 hours    Pain management:  acetaminophen     Tablet .. 1000 milliGRAM(s) Oral every 8 hours  celecoxib 200 milliGRAM(s) Oral every 12 hours  gabapentin 100 milliGRAM(s) Oral every 8 hours  HYDROmorphone  Injectable 0.5 milliGRAM(s) IV Push every 3 hours PRN  melatonin 5 milliGRAM(s) Oral at bedtime  ondansetron Injectable 4 milliGRAM(s) IV Push every 6 hours PRN  oxyCODONE    IR 5 milliGRAM(s) Oral every 3 hours PRN  oxyCODONE    IR 10 milliGRAM(s) Oral every 3 hours PRN    DVT prophylaxis:  Eliquis      RADIOLOGY & ADDITIONAL STUDIES:    ASSESSMENT AND PLAN:   - B/L hands neuropathy. will start gabapentin, Pt will need outpatient work up.   - Analgesic pain control  - DVT prophylaxis:  Eliquis2.5mg twice a day    SCDs       - Antibiotics:  Ancef  for 24 hours   - HO prophylaxis/Pain Management: Celebrex 200mg twice a day x 21 days   - PT/OT: Weight Bearing Status:  Weight bearing as tolerated, OOBTC           Anterior Hip precautions        -  Incentive spirometry  - ice  - IVF  - Advance diet as tolerated  - Hospitalist is following  -  Follow up labs  -  Disposition: Home     vs [facility] pending PT progress tomorrow

## 2024-03-22 LAB
ANION GAP SERPL CALC-SCNC: 7 MMOL/L — SIGNIFICANT CHANGE UP (ref 5–17)
BUN SERPL-MCNC: 15 MG/DL — SIGNIFICANT CHANGE UP (ref 7–23)
CALCIUM SERPL-MCNC: 8.4 MG/DL — SIGNIFICANT CHANGE UP (ref 8.4–10.5)
CHLORIDE SERPL-SCNC: 107 MMOL/L — SIGNIFICANT CHANGE UP (ref 96–108)
CO2 SERPL-SCNC: 27 MMOL/L — SIGNIFICANT CHANGE UP (ref 22–31)
CREAT SERPL-MCNC: 0.73 MG/DL — SIGNIFICANT CHANGE UP (ref 0.5–1.3)
EGFR: 91 ML/MIN/1.73M2 — SIGNIFICANT CHANGE UP
GLUCOSE SERPL-MCNC: 107 MG/DL — HIGH (ref 70–99)
HCT VFR BLD CALC: 28.9 % — LOW (ref 34.5–45)
HGB BLD-MCNC: 9.9 G/DL — LOW (ref 11.5–15.5)
MCHC RBC-ENTMCNC: 30.8 PG — SIGNIFICANT CHANGE UP (ref 27–34)
MCHC RBC-ENTMCNC: 34.3 GM/DL — SIGNIFICANT CHANGE UP (ref 32–36)
MCV RBC AUTO: 90 FL — SIGNIFICANT CHANGE UP (ref 80–100)
NRBC # BLD: 0 /100 WBCS — SIGNIFICANT CHANGE UP (ref 0–0)
PLATELET # BLD AUTO: 195 K/UL — SIGNIFICANT CHANGE UP (ref 150–400)
POTASSIUM SERPL-MCNC: 4.1 MMOL/L — SIGNIFICANT CHANGE UP (ref 3.5–5.3)
POTASSIUM SERPL-SCNC: 4.1 MMOL/L — SIGNIFICANT CHANGE UP (ref 3.5–5.3)
RBC # BLD: 3.21 M/UL — LOW (ref 3.8–5.2)
RBC # FLD: 13.2 % — SIGNIFICANT CHANGE UP (ref 10.3–14.5)
SODIUM SERPL-SCNC: 141 MMOL/L — SIGNIFICANT CHANGE UP (ref 135–145)
VIT B12 SERPL-MCNC: 346 PG/ML — SIGNIFICANT CHANGE UP (ref 232–1245)
WBC # BLD: 9.43 K/UL — SIGNIFICANT CHANGE UP (ref 3.8–10.5)
WBC # FLD AUTO: 9.43 K/UL — SIGNIFICANT CHANGE UP (ref 3.8–10.5)

## 2024-03-22 PROCEDURE — 99232 SBSQ HOSP IP/OBS MODERATE 35: CPT

## 2024-03-22 RX ORDER — CELECOXIB 200 MG/1
1 CAPSULE ORAL
Qty: 60 | Refills: 0
Start: 2024-03-22 | End: 2024-04-20

## 2024-03-22 RX ORDER — OMEPRAZOLE 10 MG/1
1 CAPSULE, DELAYED RELEASE ORAL
Qty: 30 | Refills: 0
Start: 2024-03-22 | End: 2024-04-20

## 2024-03-22 RX ORDER — APIXABAN 2.5 MG/1
1 TABLET, FILM COATED ORAL
Qty: 56 | Refills: 0
Start: 2024-03-22 | End: 2024-04-18

## 2024-03-22 RX ORDER — GABAPENTIN 400 MG/1
1 CAPSULE ORAL
Qty: 21 | Refills: 0
Start: 2024-03-22 | End: 2024-03-28

## 2024-03-22 RX ORDER — APIXABAN 2.5 MG/1
2.5 TABLET, FILM COATED ORAL EVERY 12 HOURS
Refills: 0 | Status: DISCONTINUED | OUTPATIENT
Start: 2024-03-22 | End: 2024-03-25

## 2024-03-22 RX ORDER — OXYCODONE HYDROCHLORIDE 5 MG/1
1 TABLET ORAL
Qty: 42 | Refills: 0
Start: 2024-03-22

## 2024-03-22 RX ADMIN — SODIUM CHLORIDE 125 MILLILITER(S): 9 INJECTION, SOLUTION INTRAVENOUS at 05:47

## 2024-03-22 RX ADMIN — PANTOPRAZOLE SODIUM 40 MILLIGRAM(S): 20 TABLET, DELAYED RELEASE ORAL at 05:45

## 2024-03-22 RX ADMIN — SODIUM CHLORIDE 125 MILLILITER(S): 9 INJECTION, SOLUTION INTRAVENOUS at 00:05

## 2024-03-22 RX ADMIN — Medication 5 MILLIGRAM(S): at 21:06

## 2024-03-22 RX ADMIN — Medication 1000 MILLIGRAM(S): at 06:18

## 2024-03-22 RX ADMIN — GABAPENTIN 100 MILLIGRAM(S): 400 CAPSULE ORAL at 21:06

## 2024-03-22 RX ADMIN — OXYCODONE HYDROCHLORIDE 10 MILLIGRAM(S): 5 TABLET ORAL at 20:40

## 2024-03-22 RX ADMIN — CELECOXIB 200 MILLIGRAM(S): 200 CAPSULE ORAL at 09:47

## 2024-03-22 RX ADMIN — OXYCODONE HYDROCHLORIDE 10 MILLIGRAM(S): 5 TABLET ORAL at 00:05

## 2024-03-22 RX ADMIN — Medication 1000 MILLIGRAM(S): at 14:39

## 2024-03-22 RX ADMIN — Medication 1000 MILLIGRAM(S): at 05:45

## 2024-03-22 RX ADMIN — OXYCODONE HYDROCHLORIDE 10 MILLIGRAM(S): 5 TABLET ORAL at 09:00

## 2024-03-22 RX ADMIN — Medication 100 MILLIGRAM(S): at 00:03

## 2024-03-22 RX ADMIN — SENNA PLUS 2 TABLET(S): 8.6 TABLET ORAL at 21:07

## 2024-03-22 RX ADMIN — CELECOXIB 200 MILLIGRAM(S): 200 CAPSULE ORAL at 09:17

## 2024-03-22 RX ADMIN — OXYCODONE HYDROCHLORIDE 10 MILLIGRAM(S): 5 TABLET ORAL at 01:05

## 2024-03-22 RX ADMIN — OXYCODONE HYDROCHLORIDE 10 MILLIGRAM(S): 5 TABLET ORAL at 19:42

## 2024-03-22 RX ADMIN — APIXABAN 2.5 MILLIGRAM(S): 2.5 TABLET, FILM COATED ORAL at 09:17

## 2024-03-22 RX ADMIN — CELECOXIB 200 MILLIGRAM(S): 200 CAPSULE ORAL at 21:08

## 2024-03-22 RX ADMIN — Medication 1000 MILLIGRAM(S): at 21:07

## 2024-03-22 RX ADMIN — CELECOXIB 200 MILLIGRAM(S): 200 CAPSULE ORAL at 21:06

## 2024-03-22 RX ADMIN — APIXABAN 2.5 MILLIGRAM(S): 2.5 TABLET, FILM COATED ORAL at 21:06

## 2024-03-22 RX ADMIN — Medication 1000 MILLIGRAM(S): at 21:08

## 2024-03-22 RX ADMIN — GABAPENTIN 100 MILLIGRAM(S): 400 CAPSULE ORAL at 14:39

## 2024-03-22 RX ADMIN — Medication 101.6 MILLIGRAM(S): at 05:44

## 2024-03-22 RX ADMIN — OXYCODONE HYDROCHLORIDE 10 MILLIGRAM(S): 5 TABLET ORAL at 08:20

## 2024-03-22 RX ADMIN — GABAPENTIN 100 MILLIGRAM(S): 400 CAPSULE ORAL at 05:45

## 2024-03-22 NOTE — PROGRESS NOTE ADULT - SUBJECTIVE AND OBJECTIVE BOX
Patient is a 68y old  Female who presents with a chief complaint of left hip OA for elective left ELMO (21 Mar 2024 17:35)      SUBJECTIVE / OVERNIGHT EVENTS: Patient seen and examined at bedside. No acute events overnight. Improved paresthesia in hands. Mild left hip pain. Tolerating PO.    MEDICATIONS  (STANDING):  acetaminophen     Tablet .. 1000 milliGRAM(s) Oral every 8 hours  celecoxib 200 milliGRAM(s) Oral every 12 hours  chlorhexidine 2% Cloths 1 Application(s) Topical once  gabapentin 100 milliGRAM(s) Oral every 8 hours  lactated ringers. 1000 milliLiter(s) (125 mL/Hr) IV Continuous <Continuous>  melatonin 5 milliGRAM(s) Oral at bedtime  pantoprazole    Tablet 40 milliGRAM(s) Oral before breakfast  polyethylene glycol 3350 17 Gram(s) Oral at bedtime  senna 2 Tablet(s) Oral at bedtime    MEDICATIONS  (PRN):  aluminum hydroxide/magnesium hydroxide/simethicone Suspension 30 milliLiter(s) Oral four times a day PRN Indigestion  HYDROmorphone  Injectable 0.5 milliGRAM(s) IV Push every 3 hours PRN Breakthrough pain  magnesium hydroxide Suspension 30 milliLiter(s) Oral daily PRN Constipation  ondansetron Injectable 4 milliGRAM(s) IV Push every 6 hours PRN Nausea and/or Vomiting  oxyCODONE    IR 5 milliGRAM(s) Oral every 3 hours PRN Moderate Pain (4 - 6)  oxyCODONE    IR 10 milliGRAM(s) Oral every 3 hours PRN Severe Pain (7 - 10)      CAPILLARY BLOOD GLUCOSE        I&O's Summary    21 Mar 2024 07:01  -  22 Mar 2024 07:00  --------------------------------------------------------  IN: 5114 mL / OUT: 2935 mL / NET: 2179 mL        PHYSICAL EXAM:  Vital Signs Last 24 Hrs  T(C): 36.8 (22 Mar 2024 07:44), Max: 37.2 (21 Mar 2024 11:15)  T(F): 98.2 (22 Mar 2024 07:44), Max: 98.9 (21 Mar 2024 11:15)  HR: 76 (22 Mar 2024 07:44) (76 - 93)  BP: 111/68 (22 Mar 2024 07:44) (85/34 - 114/66)  BP(mean): 58 (21 Mar 2024 14:30) (58 - 58)  RR: 17 (22 Mar 2024 07:44) (11 - 18)  SpO2: 95% (22 Mar 2024 07:44) (95% - 100%)    Parameters below as of 22 Mar 2024 07:44  Patient On (Oxygen Delivery Method): room air        GEN: female in NAD, appears comfortable, no diaphoresis  EYES: No scleral injection, EOMI  ENTM: neck supple & symmetric without tracheal deviation, moist membranes, no gross hearing impairment, thyroid gland not enlarged  CV: +S1/S2, no m/r/g, no abdominal bruit, no LE edema  RESP: breathing comfortably, no respiratory accessory muscle use, CTAB, no w/r/r  GI: normoactive BS, soft, NTND, no rebounding/guarding, no palpable masses    LABS:                        9.9    9.43  )-----------( 195      ( 22 Mar 2024 06:30 )             28.9     03-22    141  |  107  |  15  ----------------------------<  107<H>  4.1   |  27  |  0.73    Ca    8.4      22 Mar 2024 06:30            Urinalysis Basic - ( 22 Mar 2024 06:30 )    Color: x / Appearance: x / SG: x / pH: x  Gluc: 107 mg/dL / Ketone: x  / Bili: x / Urobili: x   Blood: x / Protein: x / Nitrite: x   Leuk Esterase: x / RBC: x / WBC x   Sq Epi: x / Non Sq Epi: x / Bacteria: x          RADIOLOGY & ADDITIONAL TESTS:  Results Reviewed:   Imaging Personally Reviewed:  Electrocardiogram Personally Reviewed:    COORDINATION OF CARE:  Care Discussed with Consultants/Other Providers [Y/N]:  Prior or Outpatient Records Reviewed [Y/N]:

## 2024-03-22 NOTE — OCCUPATIONAL THERAPY INITIAL EVALUATION ADULT - ADDITIONAL COMMENTS
Lives alone in an apartment in attic of a house with 13 steps to enter. Stall shower. Will not have assist at home.

## 2024-03-22 NOTE — SOCIAL WORK PROGRESS NOTE - NSSWPROGRESSNOTE_GEN_ALL_CORE
Pt has been accepted to [facility] pending bed availability and patient will need auth from [id number].  Daquan Varner will open up case and then SW will requested auth.  [facility] to follow.

## 2024-03-22 NOTE — PROGRESS NOTE ADULT - SUBJECTIVE AND OBJECTIVE BOX
Procedure:  Left Anterior THR  POD#: 1    S: Pt without complaints. No SOB,CP, N/V. Tolerated Fluids / Diet well.   Pain comfortable on Tylenol + Celebrex; oxy 5 used once and 10 used once so far.  No BM yet  + flatus, No abdominal pain.  Pain Rx:   acetaminophen     Tablet .. 1000 milliGRAM(s) Oral every 8 hours  celecoxib 200 milliGRAM(s) Oral every 12 hours  gabapentin 100 milliGRAM(s) Oral every 8 hours  HYDROmorphone  Injectable 0.5 milliGRAM(s) IV Push every 3 hours PRN  melatonin 5 milliGRAM(s) Oral at bedtime  ondansetron Injectable 4 milliGRAM(s) IV Push every 6 hours PRN  oxyCODONE    IR 5 milliGRAM(s) Oral every 3 hours PRN  oxyCODONE    IR 10 milliGRAM(s) Oral every 3 hours PRN    O: General: Pt Alert and oriented, On exam NAD,   VS: Vital Signs Last 24 Hrs  T(C): 36.8 (22 Mar 2024 07:44), Max: 37.2 (21 Mar 2024 11:15)  T(F): 98.2 (22 Mar 2024 07:44), Max: 98.9 (21 Mar 2024 11:15)  HR: 76 (22 Mar 2024 07:44) (76 - 93)  BP: 111/68 (22 Mar 2024 07:44) (85/34 - 114/66)  BP(mean): 58 (21 Mar 2024 14:30) (58 - 58)  RR: 17 (22 Mar 2024 07:44) (11 - 18)  SpO2: 95% (22 Mar 2024 07:44) (95% - 100%)    Parameters below as of 22 Mar 2024 07:44  Patient On (Oxygen Delivery Method): room air      Heart: RRR  Lungs: BS clear bilat.  Abdomen: Soft; no distention, benign exam    Left hip silverlon clean and dry. Hemovac still draining (150 cc since 11pm0> will remove later today  Neurologic: Has sensation bilat. feet & toes ;  Full AROM bilat feet & toes. EHL / AT  = Bilat: 5/5 ; Sensation Present mid lateral thigh.  Hands w/ less numbness/tingling than yesterday  Vascular: Feet toes warm, pink. DP = 2+. Calves soft ; w/o tenderness bilat..  VTEP: On Bilat. Venodynes +   apixaban 2.5 milliGRAM(s) Oral every 12 hours    H.O Prophylaxis: preop RT  Activity in PT : unable to walk yesterday due to orthostatic bp.                          9.9    9.43  )-----------( 195      ( 22 Mar 2024 06:30 )             28.9     03-22    141  |  107  |  15  ----------------------------<  107<H>  4.1   |  27  |  0.73    Ca    8.4      22 Mar 2024 06:30        Hospitalist input noted    Primary Orthopedic Assessment:  • Stable from Orthopedic perspective  • Neuro motor exam stable:   • Labs: postop anemia, bmp stable      Plan:   • Continue:  PT/OT/Weightbearing as tolerated with assistance of a walker/Anterior THR precautions/Ice to hip/          Incentive spirometry encouraged   • Continue DVT prophylaxis as prescribed, including use of compression devices and ankle pumps  • Continue Pain Rx  • Anterior hip precautions reviewed with patient  • Plans per Medicine  • Discharge planning – anticipated discharge is Home D/C with home care & home PT  when medically stable & cleared by PT/OT--today     Opioid safety discussed w/ pt.  Do not keep opioid in the medicine cabinet in bathroom or on kitchen counter where others may have access to it.  Do not drive while taking opioid.  To dispose of it some pharmacies do have drop boxes as do police stations.  Do not flush or put in trash.

## 2024-03-22 NOTE — SOCIAL WORK PROGRESS NOTE - NSSWPROGRESSNOTE_GEN_ALL_CORE
[facility] requested authorization from [id number] for patient to go to [facility].  ref # [id number].  fax number .

## 2024-03-22 NOTE — CARE COORDINATION ASSESSMENT. - NSPASTMEDSURGHISTORY_GEN_ALL_CORE_FT
PAST MEDICAL & SURGICAL HISTORY:  2019 novel coronavirus disease (COVID-19)      Vaginal delivery      History of multiple miscarriages      Vitamin D deficiency      Renal cyst      Nocturia      Microhematuria      Kidney stone on right side      Genital prolapse      Fecal incontinence      Acute depression      Acute anxiety      Stress incontinence, female      Rectocele      Female cystocele      H/O dilation and curettage      H/O breast biopsy      Osteoarthritis      Osteoarthritis of left hip      Status post creation of urethral sling by suprapubic approach      Perineocele      Rectal prolapse

## 2024-03-22 NOTE — SOCIAL WORK PROGRESS NOTE - NSSWPROGRESSNOTE_GEN_ALL_CORE
AMNA received message from Colleen at [facility] that we need to fax updated PT/OT notes on Sunday or Monday and then she will give auth.  phone  [phone] and fax .

## 2024-03-22 NOTE — PATIENT CHOICE NOTE. - NSPTCHOICESTATE_GEN_ALL_CORE

## 2024-03-22 NOTE — CARE COORDINATION ASSESSMENT. - NSCAREPROVIDERS_GEN_ALL_CORE_FT
CARE PROVIDERS:  Administration: Kayleen Pena  Administration: Monster Guillaume  Administration: Reed Clifford  Administration: Derick Logan  Admitting: Hernando James  Attending: Hernando James  Consultant: Nikunj Lau  Nurse: Darlene Villagomez  Nurse: Shanae Merchant  Nurse: Samantha Khan  Nurse: Man Knight  Occupational Therapy: Joelle Cuenca  Occupational Therapy: Tena Doll  Ordered: Nikunj Lau  Override: Trino Calhoun  Override: Porter Triplett  PCA/Nursing Assistant: Selena Schaeffer  Physical Therapy: Alena Kennedy  Primary Team: Wayne Saleh  Primary Team: Gil Johnson  Primary Team: Renetta Levin  Primary Team: Yesi Diana  Primary Team: Nancy Hoffman  Registered Dietitian: Marleen Dial  : Refugio Alva  Team: ALYCIA  Hospitalists, Team  UR// Supp. Assoc.: Clarissa Munguia

## 2024-03-22 NOTE — CARE COORDINATION ASSESSMENT. - ASSESSMENT CONCERNS TO BE ADDRESSED
Pt is a 68 year old female who lives home alone in 3rd floor apt with 13 steps.  Pt admitted for left THR.  She is alert and oriented.  PT is recommending HEATHER but patients 1199 insurance does not cover HEATHER only acute rehab.  SW referral patient to [facility] acute re[facility].  If patient needs to go home she states she has a friend who will assist (but he does not drive and he is older man).  Pt has a son [facility] but she does not want him coming to assist.  SW to follow for needs and support./care coordination

## 2024-03-22 NOTE — CARE COORDINATION ASSESSMENT. - MET/SPOKE WITH
PRIOR NOTES  38yo m here to discuss ejaculatory dysfunction.  PMHx: DM2  Meds: Metformin, mounjaro; rosuvostatin, lisinopril  BMI 33.7  Also has ED, on tadalafil. Reports this is well treated. Primary concern is he and his GF are considering starting a family and occasionally he has very little to know semen with ejaculation. Pt reports this issue is relatively recent, past 2y or so. No issues reaching orgasm.   No prior  issues, no prior  trauma. No hx mumps.  Endorses 4-5 EtOH beverages per day. 2 beers, 1-2 shots.   No delayed ejaculation.   Reports libido is somewhat diminished. Energy levels poor.     10/2023 labs  - T -  upon review, this is his free T, not total T as initially thought. Total T is wnl.    - FSH - 3.4    UPDATED SUBJECTIVE HISTORY  10/12/23 -he has not decreased his alcohol intake at all, estimates 4-5 drinks per night.  He has been able to reach orgasm with ejaculate.  He and his partner have not started to try to conceive, but both are interested in her getting evaluated.    Past Medical History  He has a past medical history of Diabetes mellitus (CMS/[facility]), Erectile dysfunction, Hypercholesteremia, and Hypertension.    Surgical History  He has no past surgical history on file.     Social History  He reports that he has quit smoking. His smoking use included cigarettes. He has never used smokeless tobacco. He reports current alcohol use. He reports that he does not use drugs.    Family History  Family History   Problem Relation Name Age of Onset    Hypertension Mother      Hyperlipidemia Mother      Hyperlipidemia Father      Hypertension Father      Diabetes Father      Hypertension Brother      Diabetes Maternal Grandmother      Heart disease Maternal Grandfather      Kidney disease Paternal Grandfather          Allergies  Lisinopril    ROS: 12 system review was completed and is negative with the exception of those signs and symptoms noted in the history of present illness: A 12 system  review was completed and is negative with the exception of those signs and symptoms noted in the history of present illness.     Exam:  General: in NAD, appears stated age  Head: normocephalic, atraumatic  Respiratory: normal effort, no use of accessory muscles  Cardiovascular: no edema noted  Skin: normal turgor, no rashes  Neurologic: grossly intact, oriented to person/place/time  Psychiatric: mode and affect appropriate     Last Recorded Vitals  There were no vitals taken for this visit.    Lab Results   Component Value Date    CREATININE 1.11 10/05/2023    HGB 15.3 10/05/2023         ASSESSMENT/PLAN:  #Hypogonadism  -Strongly suspect behavioral secondary to alcohol intake, poor sleep, lack of exercise  -Recommend recheck of testosterone, prolactin, LH/FSH, estradiol  -We briefly discussed treatment options for men who desired fertility, this would include Natesto versus Clomid  -I recommended evaluation with our infertility expert, Dr. Hartley    #Erectile dysfunction  -Continue tadalafil 5 mg daily  -I suspect this would improve with improved testosterone    Trevon Gregory MD     patient

## 2024-03-23 LAB
ANION GAP SERPL CALC-SCNC: 5 MMOL/L — SIGNIFICANT CHANGE UP (ref 5–17)
BUN SERPL-MCNC: 15 MG/DL — SIGNIFICANT CHANGE UP (ref 7–23)
CALCIUM SERPL-MCNC: 8.4 MG/DL — SIGNIFICANT CHANGE UP (ref 8.4–10.5)
CHLORIDE SERPL-SCNC: 109 MMOL/L — HIGH (ref 96–108)
CO2 SERPL-SCNC: 30 MMOL/L — SIGNIFICANT CHANGE UP (ref 22–31)
CREAT SERPL-MCNC: 0.68 MG/DL — SIGNIFICANT CHANGE UP (ref 0.5–1.3)
EGFR: 96 ML/MIN/1.73M2 — SIGNIFICANT CHANGE UP
GLUCOSE SERPL-MCNC: 91 MG/DL — SIGNIFICANT CHANGE UP (ref 70–99)
HCT VFR BLD CALC: 27.9 % — LOW (ref 34.5–45)
HGB BLD-MCNC: 9.4 G/DL — LOW (ref 11.5–15.5)
MCHC RBC-ENTMCNC: 30.9 PG — SIGNIFICANT CHANGE UP (ref 27–34)
MCHC RBC-ENTMCNC: 33.7 GM/DL — SIGNIFICANT CHANGE UP (ref 32–36)
MCV RBC AUTO: 91.8 FL — SIGNIFICANT CHANGE UP (ref 80–100)
NRBC # BLD: 0 /100 WBCS — SIGNIFICANT CHANGE UP (ref 0–0)
PLATELET # BLD AUTO: 172 K/UL — SIGNIFICANT CHANGE UP (ref 150–400)
POTASSIUM SERPL-MCNC: 4.1 MMOL/L — SIGNIFICANT CHANGE UP (ref 3.5–5.3)
POTASSIUM SERPL-SCNC: 4.1 MMOL/L — SIGNIFICANT CHANGE UP (ref 3.5–5.3)
RBC # BLD: 3.04 M/UL — LOW (ref 3.8–5.2)
RBC # FLD: 13.5 % — SIGNIFICANT CHANGE UP (ref 10.3–14.5)
SODIUM SERPL-SCNC: 144 MMOL/L — SIGNIFICANT CHANGE UP (ref 135–145)
WBC # BLD: 9.09 K/UL — SIGNIFICANT CHANGE UP (ref 3.8–10.5)
WBC # FLD AUTO: 9.09 K/UL — SIGNIFICANT CHANGE UP (ref 3.8–10.5)

## 2024-03-23 PROCEDURE — 99232 SBSQ HOSP IP/OBS MODERATE 35: CPT

## 2024-03-23 RX ORDER — SODIUM CHLORIDE 9 MG/ML
1000 INJECTION INTRAMUSCULAR; INTRAVENOUS; SUBCUTANEOUS ONCE
Refills: 0 | Status: COMPLETED | OUTPATIENT
Start: 2024-03-23 | End: 2024-03-23

## 2024-03-23 RX ORDER — PREGABALIN 225 MG/1
1000 CAPSULE ORAL DAILY
Refills: 0 | Status: DISCONTINUED | OUTPATIENT
Start: 2024-03-23 | End: 2024-03-25

## 2024-03-23 RX ADMIN — GABAPENTIN 100 MILLIGRAM(S): 400 CAPSULE ORAL at 05:38

## 2024-03-23 RX ADMIN — PANTOPRAZOLE SODIUM 40 MILLIGRAM(S): 20 TABLET, DELAYED RELEASE ORAL at 05:38

## 2024-03-23 RX ADMIN — Medication 1000 MILLIGRAM(S): at 05:42

## 2024-03-23 RX ADMIN — POLYETHYLENE GLYCOL 3350 17 GRAM(S): 17 POWDER, FOR SOLUTION ORAL at 16:45

## 2024-03-23 RX ADMIN — Medication 1000 MILLIGRAM(S): at 21:39

## 2024-03-23 RX ADMIN — PREGABALIN 1000 MICROGRAM(S): 225 CAPSULE ORAL at 12:35

## 2024-03-23 RX ADMIN — OXYCODONE HYDROCHLORIDE 5 MILLIGRAM(S): 5 TABLET ORAL at 18:35

## 2024-03-23 RX ADMIN — CELECOXIB 200 MILLIGRAM(S): 200 CAPSULE ORAL at 11:25

## 2024-03-23 RX ADMIN — OXYCODONE HYDROCHLORIDE 10 MILLIGRAM(S): 5 TABLET ORAL at 21:39

## 2024-03-23 RX ADMIN — SODIUM CHLORIDE 1000 MILLILITER(S): 9 INJECTION INTRAMUSCULAR; INTRAVENOUS; SUBCUTANEOUS at 12:35

## 2024-03-23 RX ADMIN — Medication 1000 MILLIGRAM(S): at 05:37

## 2024-03-23 RX ADMIN — Medication 1000 MILLIGRAM(S): at 21:41

## 2024-03-23 RX ADMIN — Medication 1000 MILLIGRAM(S): at 17:09

## 2024-03-23 RX ADMIN — CELECOXIB 200 MILLIGRAM(S): 200 CAPSULE ORAL at 11:24

## 2024-03-23 RX ADMIN — APIXABAN 2.5 MILLIGRAM(S): 2.5 TABLET, FILM COATED ORAL at 11:24

## 2024-03-23 RX ADMIN — APIXABAN 2.5 MILLIGRAM(S): 2.5 TABLET, FILM COATED ORAL at 21:39

## 2024-03-23 RX ADMIN — OXYCODONE HYDROCHLORIDE 10 MILLIGRAM(S): 5 TABLET ORAL at 05:38

## 2024-03-23 RX ADMIN — CELECOXIB 200 MILLIGRAM(S): 200 CAPSULE ORAL at 21:39

## 2024-03-23 RX ADMIN — Medication 1000 MILLIGRAM(S): at 16:44

## 2024-03-23 RX ADMIN — OXYCODONE HYDROCHLORIDE 10 MILLIGRAM(S): 5 TABLET ORAL at 22:30

## 2024-03-23 RX ADMIN — GABAPENTIN 100 MILLIGRAM(S): 400 CAPSULE ORAL at 16:44

## 2024-03-23 RX ADMIN — GABAPENTIN 100 MILLIGRAM(S): 400 CAPSULE ORAL at 21:39

## 2024-03-23 RX ADMIN — OXYCODONE HYDROCHLORIDE 5 MILLIGRAM(S): 5 TABLET ORAL at 19:30

## 2024-03-23 RX ADMIN — SENNA PLUS 2 TABLET(S): 8.6 TABLET ORAL at 21:39

## 2024-03-23 RX ADMIN — CELECOXIB 200 MILLIGRAM(S): 200 CAPSULE ORAL at 21:41

## 2024-03-23 RX ADMIN — OXYCODONE HYDROCHLORIDE 10 MILLIGRAM(S): 5 TABLET ORAL at 06:14

## 2024-03-23 NOTE — PROGRESS NOTE ADULT - SUBJECTIVE AND OBJECTIVE BOX
Patient is a 68y old  Female who presents with a chief complaint of L ELMO (23 Mar 2024 05:35)      SUBJECTIVE / OVERNIGHT EVENTS: Patient seen and examined at bedside. No acute events overnight. Feels okay, pain tolerable. Slightly drowsy.    MEDICATIONS  (STANDING):  acetaminophen     Tablet .. 1000 milliGRAM(s) Oral every 8 hours  apixaban 2.5 milliGRAM(s) Oral every 12 hours  celecoxib 200 milliGRAM(s) Oral every 12 hours  chlorhexidine 2% Cloths 1 Application(s) Topical once  cyanocobalamin 1000 MICROGram(s) Oral daily  gabapentin 100 milliGRAM(s) Oral every 8 hours  pantoprazole    Tablet 40 milliGRAM(s) Oral before breakfast  polyethylene glycol 3350 17 Gram(s) Oral at bedtime  senna 2 Tablet(s) Oral at bedtime    MEDICATIONS  (PRN):  bisacodyl Suppository 10 milliGRAM(s) Rectal once PRN Constipation  HYDROmorphone  Injectable 0.5 milliGRAM(s) IV Push every 3 hours PRN Breakthrough pain  magnesium hydroxide Suspension 30 milliLiter(s) Oral daily PRN Constipation  ondansetron Injectable 4 milliGRAM(s) IV Push every 6 hours PRN Nausea and/or Vomiting  oxyCODONE    IR 5 milliGRAM(s) Oral every 3 hours PRN Moderate Pain (4 - 6)  oxyCODONE    IR 10 milliGRAM(s) Oral every 3 hours PRN Severe Pain (7 - 10)      CAPILLARY BLOOD GLUCOSE        I&O's Summary    22 Mar 2024 07:01  -  23 Mar 2024 07:00  --------------------------------------------------------  IN: 250 mL / OUT: 1600 mL / NET: -1350 mL        PHYSICAL EXAM:  Vital Signs Last 24 Hrs  T(C): 36.7 (23 Mar 2024 07:49), Max: 36.8 (22 Mar 2024 15:22)  T(F): 98.1 (23 Mar 2024 07:49), Max: 98.3 (22 Mar 2024 15:22)  HR: 80 (23 Mar 2024 07:49) (76 - 85)  BP: 114/73 (23 Mar 2024 07:49) (94/52 - 116/69)  BP(mean): 85 (22 Mar 2024 15:22) (85 - 85)  RR: 16 (23 Mar 2024 07:49) (16 - 17)  SpO2: 92% (23 Mar 2024 07:49) (92% - 96%)    Parameters below as of 22 Mar 2024 23:04  Patient On (Oxygen Delivery Method): room air        GEN: female in NAD, appears comfortable, no diaphoresis  EYES: No scleral injection, EOMI  ENTM: neck supple & symmetric without tracheal deviation, moist membranes, no gross hearing impairment, thyroid gland not enlarged  CV: +S1/S2, no m/r/g, no abdominal bruit, no LE edema  RESP: breathing comfortably, no respiratory accessory muscle use, CTAB, no w/r/r  GI: normoactive BS, soft, NTND, no rebounding/guarding, no palpable masses    LABS:                        9.4    9.09  )-----------( 172      ( 23 Mar 2024 06:17 )             27.9     03-23    144  |  109<H>  |  15  ----------------------------<  91  4.1   |  30  |  0.68    Ca    8.4      23 Mar 2024 06:17            Urinalysis Basic - ( 23 Mar 2024 06:17 )    Color: x / Appearance: x / SG: x / pH: x  Gluc: 91 mg/dL / Ketone: x  / Bili: x / Urobili: x   Blood: x / Protein: x / Nitrite: x   Leuk Esterase: x / RBC: x / WBC x   Sq Epi: x / Non Sq Epi: x / Bacteria: x          RADIOLOGY & ADDITIONAL TESTS:  Results Reviewed:   Imaging Personally Reviewed:  Electrocardiogram Personally Reviewed:    COORDINATION OF CARE:  Care Discussed with Consultants/Other Providers [Y/N]:  Prior or Outpatient Records Reviewed [Y/N]:

## 2024-03-23 NOTE — PROGRESS NOTE ADULT - SUBJECTIVE AND OBJECTIVE BOX
POST OPERATIVE DAY #: 2  STATUS POST: Left Anterior THR                       SUBJECTIVE: Patient seen and examined. Reports that she is very fatigued today. She has had episodes of light-headedness when up with PT. Standing BP documented at 93/54, sitting BP = 105/62  Reported Pain score = 3    OBJECTIVE:     Vital Signs Last 24 Hrs  T(C): 36.7 (23 Mar 2024 07:49), Max: 36.8 (22 Mar 2024 15:22)  T(F): 98.1 (23 Mar 2024 07:49), Max: 98.3 (22 Mar 2024 15:22)  HR: 80 (23 Mar 2024 07:49) (76 - 85)  BP: 114/73 (23 Mar 2024 07:49) (94/52 - 116/69)  BP(mean): 85 (22 Mar 2024 15:22) (85 - 85)  RR: 16 (23 Mar 2024 07:49) (16 - 17)  SpO2: 96% (23 Mar 2024 11:50) (92% - 96%)    Parameters below as of 22 Mar 2024 23:04  Patient On (Oxygen Delivery Method): room air        Left hip:        [place] Dressing: clean/dry/intact    Bilateral LEs:         Sensation:  intact to light touch          Motor exam:  5/5 dorsiflexion/plantarflexion/EHL          2+ DP pulses          calf supple, NT         SCDs in place    LABS:                        9.4    9.09  )-----------( 172      ( 23 Mar 2024 06:17 )             27.9     03-23    144  |  109<H>  |  15  ----------------------------<  91  4.1   |  30  |  0.68    Ca    8.4      23 Mar 2024 06:17        MEDICATIONS:  Anticoagulation:  apixaban 2.5 milliGRAM(s) Oral every 12 hours      Pain medications:   acetaminophen     Tablet .. 1000 milliGRAM(s) Oral every 8 hours  celecoxib 200 milliGRAM(s) Oral every 12 hours  gabapentin 100 milliGRAM(s) Oral every 8 hours  HYDROmorphone  Injectable 0.5 milliGRAM(s) IV Push every 3 hours PRN  oxyCODONE    IR 5 milliGRAM(s) Oral every 3 hours PRN  oxyCODONE    IR 10 milliGRAM(s) Oral every 3 hours PRN        A/P :   s/p Left Anterior THR POD # 2, light-headed with low BP  -    Discussed with Dr. James. 1 Liter NS bolus ordered  -    Pain control  -    DVT ppx: Eliquis 2.5mg q 12 h   -    Weight bearing status: WBAT   -    Continue anterior total hip precautions  -    Physical Therapy  -    Occupational Therapy  -    Discharge plan: [place] possibly Monday

## 2024-03-24 LAB
ANION GAP SERPL CALC-SCNC: 7 MMOL/L — SIGNIFICANT CHANGE UP (ref 5–17)
BUN SERPL-MCNC: 13 MG/DL — SIGNIFICANT CHANGE UP (ref 7–23)
CALCIUM SERPL-MCNC: 8.7 MG/DL — SIGNIFICANT CHANGE UP (ref 8.4–10.5)
CHLORIDE SERPL-SCNC: 109 MMOL/L — HIGH (ref 96–108)
CO2 SERPL-SCNC: 30 MMOL/L — SIGNIFICANT CHANGE UP (ref 22–31)
CREAT SERPL-MCNC: 0.67 MG/DL — SIGNIFICANT CHANGE UP (ref 0.5–1.3)
EGFR: 96 ML/MIN/1.73M2 — SIGNIFICANT CHANGE UP
GLUCOSE SERPL-MCNC: 106 MG/DL — HIGH (ref 70–99)
HCT VFR BLD CALC: 30.7 % — LOW (ref 34.5–45)
HGB BLD-MCNC: 9.9 G/DL — LOW (ref 11.5–15.5)
MCHC RBC-ENTMCNC: 29.9 PG — SIGNIFICANT CHANGE UP (ref 27–34)
MCHC RBC-ENTMCNC: 32.2 GM/DL — SIGNIFICANT CHANGE UP (ref 32–36)
MCV RBC AUTO: 92.7 FL — SIGNIFICANT CHANGE UP (ref 80–100)
NRBC # BLD: 0 /100 WBCS — SIGNIFICANT CHANGE UP (ref 0–0)
PLATELET # BLD AUTO: 190 K/UL — SIGNIFICANT CHANGE UP (ref 150–400)
POTASSIUM SERPL-MCNC: 4.2 MMOL/L — SIGNIFICANT CHANGE UP (ref 3.5–5.3)
POTASSIUM SERPL-SCNC: 4.2 MMOL/L — SIGNIFICANT CHANGE UP (ref 3.5–5.3)
RBC # BLD: 3.31 M/UL — LOW (ref 3.8–5.2)
RBC # FLD: 13.2 % — SIGNIFICANT CHANGE UP (ref 10.3–14.5)
SODIUM SERPL-SCNC: 146 MMOL/L — HIGH (ref 135–145)
WBC # BLD: 7.33 K/UL — SIGNIFICANT CHANGE UP (ref 3.8–10.5)
WBC # FLD AUTO: 7.33 K/UL — SIGNIFICANT CHANGE UP (ref 3.8–10.5)

## 2024-03-24 PROCEDURE — 99232 SBSQ HOSP IP/OBS MODERATE 35: CPT

## 2024-03-24 RX ADMIN — APIXABAN 2.5 MILLIGRAM(S): 2.5 TABLET, FILM COATED ORAL at 21:43

## 2024-03-24 RX ADMIN — CELECOXIB 200 MILLIGRAM(S): 200 CAPSULE ORAL at 21:43

## 2024-03-24 RX ADMIN — Medication 1000 MILLIGRAM(S): at 16:30

## 2024-03-24 RX ADMIN — GABAPENTIN 100 MILLIGRAM(S): 400 CAPSULE ORAL at 06:06

## 2024-03-24 RX ADMIN — PANTOPRAZOLE SODIUM 40 MILLIGRAM(S): 20 TABLET, DELAYED RELEASE ORAL at 06:06

## 2024-03-24 RX ADMIN — GABAPENTIN 100 MILLIGRAM(S): 400 CAPSULE ORAL at 21:43

## 2024-03-24 RX ADMIN — Medication 1000 MILLIGRAM(S): at 22:07

## 2024-03-24 RX ADMIN — Medication 1000 MILLIGRAM(S): at 06:06

## 2024-03-24 RX ADMIN — APIXABAN 2.5 MILLIGRAM(S): 2.5 TABLET, FILM COATED ORAL at 09:15

## 2024-03-24 RX ADMIN — Medication 1000 MILLIGRAM(S): at 16:47

## 2024-03-24 RX ADMIN — CELECOXIB 200 MILLIGRAM(S): 200 CAPSULE ORAL at 21:47

## 2024-03-24 RX ADMIN — CELECOXIB 200 MILLIGRAM(S): 200 CAPSULE ORAL at 09:16

## 2024-03-24 RX ADMIN — GABAPENTIN 100 MILLIGRAM(S): 400 CAPSULE ORAL at 16:32

## 2024-03-24 RX ADMIN — Medication 1000 MILLIGRAM(S): at 06:08

## 2024-03-24 RX ADMIN — PREGABALIN 1000 MICROGRAM(S): 225 CAPSULE ORAL at 16:29

## 2024-03-24 RX ADMIN — SENNA PLUS 2 TABLET(S): 8.6 TABLET ORAL at 21:44

## 2024-03-24 RX ADMIN — OXYCODONE HYDROCHLORIDE 5 MILLIGRAM(S): 5 TABLET ORAL at 22:57

## 2024-03-24 RX ADMIN — OXYCODONE HYDROCHLORIDE 5 MILLIGRAM(S): 5 TABLET ORAL at 23:48

## 2024-03-24 RX ADMIN — Medication 1000 MILLIGRAM(S): at 21:44

## 2024-03-24 NOTE — PROGRESS NOTE ADULT - SUBJECTIVE AND OBJECTIVE BOX
ORTHOPEDIC PA PROGRESS NOTE  SYLVIA FUENTES      68y Female                                 SY 2WST 220 01                                                                                                                           POD #    3d    STATUS POST:       Procedure: Arthroplasty of left hip by anterior approach               Patient seen and examined at bedside.      Current Pain Management:    acetaminophen     Tablet .. 1000 milliGRAM(s) Oral every 8 hours  celecoxib 200 milliGRAM(s) Oral every 12 hours  gabapentin 100 milliGRAM(s) Oral every 8 hours  HYDROmorphone  Injectable 0.5 milliGRAM(s) IV Push every 3 hours PRN  ondansetron Injectable 4 milliGRAM(s) IV Push every 6 hours PRN  oxyCODONE    IR 5 milliGRAM(s) Oral every 3 hours PRN  oxyCODONE    IR 10 milliGRAM(s) Oral every 3 hours PRN      In PACU Neurovascular Checks every 15 min for 2 hours  Then  On Floor - Every 2 hours for 8 hours  Then  Every 4 Hours For 8 Hours  Then   Every 8 Hours                         9.9    7.33  )-----------( 190      ( 24 Mar 2024 06:17 )             30.7         03-24    146<H>  |  109<H>  |  13  ----------------------------<  106<H>  4.2   |  30  |  0.67    Ca    8.7      24 Mar 2024 06:17      Physical Exam :    -   Silverlon Dressing C/D/I.   -   Distal Neurvascular status intact grossly.   -   Warm well perfused; capillary refill <3 seconds   -   (+)EHL/FHL   -   (+) Sensation to light touch  -   (-) Calf tenderness Bilaterally      A/P: 68y Female s/p Arthroplasty of left hip by anterior approach       -   Ortho Stable  -   Pain control:  acetaminophen     Tablet .. 1000 milliGRAM(s) Oral every 8 hours  celecoxib 200 milliGRAM(s) Oral every 12 hours  gabapentin 100 milliGRAM(s) Oral every 8 hours  HYDROmorphone  Injectable 0.5 milliGRAM(s) IV Push every 3 hours PRN  ondansetron Injectable 4 milliGRAM(s) IV Push every 6 hours PRN  oxyCODONE    IR 5 milliGRAM(s) Oral every 3 hours PRN  oxyCODONE    IR 10 milliGRAM(s) Oral every 3 hours PRN    -   Medicine to follow  -   DVT ppx:    PAS +  apixaban: 2.5 milliGRAM(s) Oral, apixaban: 2.5 milliGRAM(s) Oral    -   PT/OT OOB,  Weight bearing status: WBAT   -  Dispo:  HEATHER Monday  -   Prescribed Medications:  apixaban 2.5 mg oral tablet: 1 tab(s) orally every 12 hours  celecoxib 200 mg oral capsule: 1 cap(s) orally every 12 hours  gabapentin 100 mg oral capsule: 1 cap(s) orally every 8 hours  omeprazole 20 mg oral delayed release capsule: 1 cap(s) orally once a day before breakfast  oxyCODONE 5 mg oral tablet: 1 tab(s) orally every 4 hours as needed for  Moderate Pain or 2 tabs every 4 hrs as needed for severe pain    [id number] MDD: 6

## 2024-03-24 NOTE — PROGRESS NOTE ADULT - SUBJECTIVE AND OBJECTIVE BOX
Patient is a 68y old  Female who presents with a chief complaint of L ELMO (23 Mar 2024 05:35)      SUBJECTIVE / OVERNIGHT EVENTS: Patient seen and examined at bedside. No pain in left hip. Felt lightheaded when patient attempted to work BP and was given 1 liter isotonic solution bolus. No CP/SOB.    MEDICATIONS  (STANDING):  acetaminophen     Tablet .. 1000 milliGRAM(s) Oral every 8 hours  apixaban 2.5 milliGRAM(s) Oral every 12 hours  celecoxib 200 milliGRAM(s) Oral every 12 hours  chlorhexidine 2% Cloths 1 Application(s) Topical once  cyanocobalamin 1000 MICROGram(s) Oral daily  gabapentin 100 milliGRAM(s) Oral every 8 hours  pantoprazole    Tablet 40 milliGRAM(s) Oral before breakfast  polyethylene glycol 3350 17 Gram(s) Oral at bedtime  senna 2 Tablet(s) Oral at bedtime    MEDICATIONS  (PRN):  bisacodyl Suppository 10 milliGRAM(s) Rectal once PRN Constipation  HYDROmorphone  Injectable 0.5 milliGRAM(s) IV Push every 3 hours PRN Breakthrough pain  magnesium hydroxide Suspension 30 milliLiter(s) Oral daily PRN Constipation  ondansetron Injectable 4 milliGRAM(s) IV Push every 6 hours PRN Nausea and/or Vomiting  oxyCODONE    IR 5 milliGRAM(s) Oral every 3 hours PRN Moderate Pain (4 - 6)  oxyCODONE    IR 10 milliGRAM(s) Oral every 3 hours PRN Severe Pain (7 - 10)      CAPILLARY BLOOD GLUCOSE        I&O's Summary      PHYSICAL EXAM:  Vital Signs Last 24 Hrs  T(C): 36.7 (24 Mar 2024 07:47), Max: 37.1 (23 Mar 2024 15:32)  T(F): 98 (24 Mar 2024 07:47), Max: 98.7 (23 Mar 2024 15:32)  HR: 63 (24 Mar 2024 07:47) (63 - 77)  BP: 107/65 (24 Mar 2024 07:47) (102/61 - 107/65)  BP(mean): 75 (23 Mar 2024 15:32) (75 - 75)  RR: 17 (24 Mar 2024 07:47) (16 - 17)  SpO2: 96% (24 Mar 2024 07:47) (94% - 96%)    Parameters below as of 24 Mar 2024 07:47  Patient On (Oxygen Delivery Method): room air        GEN: female in NAD, appears comfortable, no diaphoresis  EYES: No scleral injection, EOMI  ENTM: neck supple & symmetric without tracheal deviation, moist membranes, no gross hearing impairment, thyroid gland not enlarged  CV: +S1/S2, no m/r/g, no abdominal bruit, no LE edema  RESP: breathing comfortably, no respiratory accessory muscle use, CTAB, no w/r/r  GI: normoactive BS, soft, NTND, no rebounding/guarding, no palpable masses    LABS:                        9.9    7.33  )-----------( 190      ( 24 Mar 2024 06:17 )             30.7     03-24    146<H>  |  109<H>  |  13  ----------------------------<  106<H>  4.2   |  30  |  0.67    Ca    8.7      24 Mar 2024 06:17            Urinalysis Basic - ( 24 Mar 2024 06:17 )    Color: x / Appearance: x / SG: x / pH: x  Gluc: 106 mg/dL / Ketone: x  / Bili: x / Urobili: x   Blood: x / Protein: x / Nitrite: x   Leuk Esterase: x / RBC: x / WBC x   Sq Epi: x / Non Sq Epi: x / Bacteria: x          RADIOLOGY & ADDITIONAL TESTS:  Results Reviewed:   Imaging Personally Reviewed:  Electrocardiogram Personally Reviewed:    COORDINATION OF CARE:  Care Discussed with Consultants/Other Providers [Y/N]:  Prior or Outpatient Records Reviewed [Y/N]:

## 2024-03-25 ENCOUNTER — TRANSCRIPTION ENCOUNTER (OUTPATIENT)
Age: 69
End: 2024-03-25

## 2024-03-25 VITALS
RESPIRATION RATE: 18 BRPM | TEMPERATURE: 98 F | DIASTOLIC BLOOD PRESSURE: 61 MMHG | HEART RATE: 78 BPM | OXYGEN SATURATION: 96 % | SYSTOLIC BLOOD PRESSURE: 100 MMHG

## 2024-03-25 PROCEDURE — 97161 PT EVAL LOW COMPLEX 20 MIN: CPT

## 2024-03-25 PROCEDURE — 97535 SELF CARE MNGMENT TRAINING: CPT

## 2024-03-25 PROCEDURE — 99232 SBSQ HOSP IP/OBS MODERATE 35: CPT

## 2024-03-25 PROCEDURE — P9016: CPT

## 2024-03-25 PROCEDURE — 82607 VITAMIN B-12: CPT

## 2024-03-25 PROCEDURE — 97165 OT EVAL LOW COMPLEX 30 MIN: CPT

## 2024-03-25 PROCEDURE — C1776: CPT

## 2024-03-25 PROCEDURE — 36430 TRANSFUSION BLD/BLD COMPNT: CPT

## 2024-03-25 PROCEDURE — 36415 COLL VENOUS BLD VENIPUNCTURE: CPT

## 2024-03-25 PROCEDURE — 94664 DEMO&/EVAL PT USE INHALER: CPT

## 2024-03-25 PROCEDURE — 85027 COMPLETE CBC AUTOMATED: CPT

## 2024-03-25 PROCEDURE — C1713: CPT

## 2024-03-25 PROCEDURE — C1889: CPT

## 2024-03-25 PROCEDURE — 97530 THERAPEUTIC ACTIVITIES: CPT

## 2024-03-25 PROCEDURE — 76000 FLUOROSCOPY <1 HR PHYS/QHP: CPT

## 2024-03-25 PROCEDURE — 97110 THERAPEUTIC EXERCISES: CPT

## 2024-03-25 PROCEDURE — 80048 BASIC METABOLIC PNL TOTAL CA: CPT

## 2024-03-25 PROCEDURE — 97116 GAIT TRAINING THERAPY: CPT

## 2024-03-25 RX ORDER — LANOLIN ALCOHOL/MO/W.PET/CERES
1 CREAM (GRAM) TOPICAL
Qty: 0 | Refills: 0 | DISCHARGE

## 2024-03-25 RX ORDER — OXYCODONE HYDROCHLORIDE 5 MG/1
2.5 TABLET ORAL
Refills: 0 | Status: DISCONTINUED | OUTPATIENT
Start: 2024-03-25 | End: 2024-03-25

## 2024-03-25 RX ORDER — APIXABAN 2.5 MG/1
1 TABLET, FILM COATED ORAL
Qty: 56 | Refills: 0
Start: 2024-03-25 | End: 2024-04-21

## 2024-03-25 RX ORDER — GABAPENTIN 400 MG/1
1 CAPSULE ORAL
Qty: 21 | Refills: 0
Start: 2024-03-25 | End: 2024-03-31

## 2024-03-25 RX ORDER — CELECOXIB 200 MG/1
1 CAPSULE ORAL
Qty: 60 | Refills: 0
Start: 2024-03-25 | End: 2024-04-23

## 2024-03-25 RX ORDER — OMEPRAZOLE 10 MG/1
1 CAPSULE, DELAYED RELEASE ORAL
Qty: 30 | Refills: 0
Start: 2024-03-25 | End: 2024-04-23

## 2024-03-25 RX ORDER — OXYCODONE HYDROCHLORIDE 5 MG/1
0.5 TABLET ORAL
Qty: 30 | Refills: 0
Start: 2024-03-25

## 2024-03-25 RX ORDER — NALOXONE HYDROCHLORIDE 4 MG/.1ML
4 SPRAY NASAL
Qty: 1 | Refills: 0
Start: 2024-03-25

## 2024-03-25 RX ADMIN — Medication 1000 MILLIGRAM(S): at 05:51

## 2024-03-25 RX ADMIN — GABAPENTIN 100 MILLIGRAM(S): 400 CAPSULE ORAL at 05:49

## 2024-03-25 RX ADMIN — Medication 1000 MILLIGRAM(S): at 12:18

## 2024-03-25 RX ADMIN — PANTOPRAZOLE SODIUM 40 MILLIGRAM(S): 20 TABLET, DELAYED RELEASE ORAL at 05:49

## 2024-03-25 RX ADMIN — PREGABALIN 1000 MICROGRAM(S): 225 CAPSULE ORAL at 12:18

## 2024-03-25 RX ADMIN — CELECOXIB 200 MILLIGRAM(S): 200 CAPSULE ORAL at 08:47

## 2024-03-25 RX ADMIN — GABAPENTIN 100 MILLIGRAM(S): 400 CAPSULE ORAL at 12:18

## 2024-03-25 RX ADMIN — APIXABAN 2.5 MILLIGRAM(S): 2.5 TABLET, FILM COATED ORAL at 08:47

## 2024-03-25 RX ADMIN — Medication 1000 MILLIGRAM(S): at 05:49

## 2024-03-25 RX ADMIN — CELECOXIB 200 MILLIGRAM(S): 200 CAPSULE ORAL at 09:30

## 2024-03-25 RX ADMIN — Medication 1000 MILLIGRAM(S): at 13:00

## 2024-03-25 NOTE — DISCHARGE NOTE NURSING/CASE MANAGEMENT/SOCIAL WORK - NSSCNAMETXT_GEN_ALL_CORE
[facility] care agency [phone] will reach out to you within 24-72 hours of your discharge to schedule home care visit/eval appointment with you. Please call agency for any queries regarding home care services

## 2024-03-25 NOTE — PROGRESS NOTE ADULT - PROVIDER SPECIALTY LIST ADULT
Orthopedics
Orthopedics
Pharmacy
Hospitalist
Internal Medicine
Internal Medicine
Orthopedics
Internal Medicine

## 2024-03-25 NOTE — PROGRESS NOTE ADULT - SUBJECTIVE AND OBJECTIVE BOX
Patient is a 68y old  Female who presents with a chief complaint of left hip replacement (24 Mar 2024 08:47)    INTERVAL HPI/OVERNIGHT EVENTS:  No acute events overnight.  This AM, patient's left hip discomfort is manageable.  No other complaints.  No numbness/weakness.  No nausea.  Was anxious earlier this morning but feels better now.        MEDICATIONS  (STANDING):  acetaminophen     Tablet .. 1000 milliGRAM(s) Oral every 8 hours  apixaban 2.5 milliGRAM(s) Oral every 12 hours  celecoxib 200 milliGRAM(s) Oral every 12 hours  chlorhexidine 2% Cloths 1 Application(s) Topical once  cyanocobalamin 1000 MICROGram(s) Oral daily  gabapentin 100 milliGRAM(s) Oral every 8 hours  pantoprazole    Tablet 40 milliGRAM(s) Oral before breakfast  polyethylene glycol 3350 17 Gram(s) Oral at bedtime  senna 2 Tablet(s) Oral at bedtime    MEDICATIONS  (PRN):  bisacodyl Suppository 10 milliGRAM(s) Rectal once PRN Constipation  HYDROmorphone  Injectable 0.5 milliGRAM(s) IV Push every 3 hours PRN Breakthrough pain  magnesium hydroxide Suspension 30 milliLiter(s) Oral daily PRN Constipation  ondansetron Injectable 4 milliGRAM(s) IV Push every 6 hours PRN Nausea and/or Vomiting  oxyCODONE    IR 2.5 milliGRAM(s) Oral every 3 hours PRN Moderate Pain (4 - 6)      Allergies  No Known Allergies    ROS as above and reviewed and is otherwise negative    PHYSICAL EXAM:  Vital Signs Last 24 Hrs  T(C): 36.9 (25 Mar 2024 07:24), Max: 36.9 (25 Mar 2024 07:24)  T(F): 98.4 (25 Mar 2024 07:24), Max: 98.4 (25 Mar 2024 07:24)  HR: 78 (25 Mar 2024 07:24) (76 - 87)  BP: 100/61 (25 Mar 2024 07:24) (100/61 - 133/83)  BP(mean): 78 (24 Mar 2024 15:33) (78 - 78)  RR: 18 (25 Mar 2024 07:24) (18 - 18)  SpO2: 96% (25 Mar 2024 07:24) (96% - 99%)    Parameters below as of 25 Mar 2024 07:24  Patient On (Oxygen Delivery Method): room air    GENERAL:     age appropriate, in NAD  HEAD:     atraumatic, normocephalic  EYES:     EOMI, conjunctiva and sclera clear  RESPIRATORY:     clear to auscultation bilaterally, no rales or rhonchi or wheezing or rubs  CARDIOVASCULAR:     s1 and s2 heard, regular rate and rhythm, no murmurs or rubs or gallops  GASTROINTESTINAL:     soft, nontender, nondistended, bowel sounds present  EXTREMITIES:     no clubbing or cyanosis or edema  MUSCULOSKELETAL:     left hip discomfort  NERVOUS SYSTEM:     able to move feet and legs  PSYCH:     appropriate, alert and orientated x3, good concentration      LABS:      Ca    8.7        24 Mar 2024 06:17        Urinalysis Basic - ( 24 Mar 2024 06:17 )    Color: x / Appearance: x / SG: x / pH: x  Gluc: 106 mg/dL / Ketone: x  / Bili: x / Urobili: x   Blood: x / Protein: x / Nitrite: x   Leuk Esterase: x / RBC: x / WBC x   Sq Epi: x / Non Sq Epi: x / Bacteria: x      CAPILLARY BLOOD GLUCOSE

## 2024-03-25 NOTE — CASE MANAGEMENT PROGRESS NOTE - NSCMPROGRESSNOTE_GEN_ALL_CORE
CM met with patient this morning to discuss transition of care as patient was denied by insurance for acute rehab. Patient is agreeable to transition to home with [facility] care agency [phone] for SN/PT SOC 3/26/2024. Patient states that her family is not involved in her care but her friend will meet her at the hospital and take an uber and settle her in at home and stay for a few days to assist as much as he can with recovery. OT is recommending commode for comfort, RX sent to [facility] [phone] to process and to deliver to bedside. Patient stated that if insurance doesn't cover it she doesn't want it. CM will cont to be available.

## 2024-03-25 NOTE — SOCIAL WORK PROGRESS NOTE - NSSWPROGRESSNOTE_GEN_ALL_CORE
SW received call from insurance company  [employer] [phone] ext [phone]. She said the medical director denied request for rehab and home care and DME can be arranged with [phone]. I updated patient and she will arrange for a friend to accompany her home and I updated  and ortho PA. Plan for home with home care

## 2024-03-25 NOTE — DISCHARGE NOTE NURSING/CASE MANAGEMENT/SOCIAL WORK - NSDCFUADDAPPT_GEN_ALL_CORE_FT
Please follow up w/ Dr James at [address] [place] [address] office  It is advisable to follow up w/ your pcp in 2-3 weeks to be sure there are no underlying problems.

## 2024-03-25 NOTE — PHARMACOTHERAPY INTERVENTION NOTE - COMMENTS
Transition of Care video discharge education - medication calendar given to patient
Admission medication reconciliation POD1   no home meds

## 2024-03-25 NOTE — PROGRESS NOTE ADULT - SUBJECTIVE AND OBJECTIVE BOX
Post Op     SYLVIA FUENTES      68y        Female                                                                                                                 T(C): 36.9 (03-25-24 @ 07:24), Max: 36.9 (03-25-24 @ 07:24)  HR: 78 (03-25-24 @ 07:24) (76 - 87)  BP: 100/61 (03-25-24 @ 07:24) (100/61 - 133/83)  RR: 18 (03-25-24 @ 07:24) (18 - 18)  SpO2: 96% (03-25-24 @ 07:24) (96% - 99%)  Wt(kg): --    S/P   Left total hip anterior    Patient denies shortness of breath, chest pain, dyspnea, No complaints  Pain is 2 /10 patient concerned over home vs  rehab  13 stairs to  apt  lives alone     Physical Exam    Extremity: Bilaterally:  No holmon                                           No Cord                                          PAS on                                          Neurovascular intact                                          Motor intact EHL/FHL                                          Sensation intact                                          Pulses intact DP/PT                                         Calves Soft                                         Dressing Clean / Dry / Intact                                          Capillary refill with 5 seconds                          9.9    7.33  )-----------( 190      ( 24 Mar 2024 06:17 )             30.7       03-24    146<H>  |  109<H>  |  13  ----------------------------<  106<H>  4.2   |  30  |  0.67    Ca    8.7      24 Mar 2024 06:17        A/P  -- S/P total hip    -  Medicine To Follow   - DVT prophylaxis PAS eliquis  - PT & OT   - Analagesia  - Incentive Spirometry  - Discharge Planning  - Safety Precautions  -  CBC , BMP daily

## 2024-03-25 NOTE — SOCIAL WORK PROGRESS NOTE - NSSWPROGRESSNOTE_GEN_ALL_CORE
AMNA spoke with OT and PT regarding patient's progress and rehab or home recommendations. I spoke with insurance company  [employer] [phone] ext [phone] who said all [facility] patient requests for rehab must go to medical director to review. She requested updated notes from today which she said I can leave on her voicemail. I called and left clinical and I met with patient to discuss possible transition home. Patient verbalized understanding and her friend who lives in [place] will need to come here via mass transit and they will take Uber home together. I updated  and team. AMNA anticipates patient will need to transition home and I updated  to arrange for home PT. SW will update team

## 2024-03-25 NOTE — PROGRESS NOTE ADULT - REASON FOR ADMISSION
NIKI BORREGO
Left ELMO
left hip pain--for left ant ELMO
left hip pain--for left ant ELMO
left hip surgery
left hip replacement

## 2024-03-25 NOTE — PROGRESS NOTE ADULT - ASSESSMENT
67yo F w/ PMH of anxiety, hx of genital prolapse, hx of nephrolithiasis, OA, presents with left hip pain due to long-standing OA, for elective left total hip replacement now on 3/21.    #left hip OA   - c/w multimodal pain management w/ Tylenol and celecoxib standing and oxycodone / Dilaudid PRN  - bowel regimen  - VTE ppx with SCDs and planned for Eliquis 2.5mg po BID per ortho protocol (hg stabilized)  - physical therapy eval --> [facility]    #acute blood loss anemia  - due to intra- and post-operative bleeding  - Hgb down from 13.3 on pre-surgical testing to 8.9 and pt given 2un PRBC (one during operation and one in the PACU)  - monitor VS  - pt denies symptoms from anemia currently     #Neuropathy  - pt reports chronic neuropathy in hands and states she had been told she had carpal tunnel syndrome in the past but not pursued treatment  - being started on gabapentin  - B12 low normal in the 300s so will start supplementation  - may need to pursue neuro eval and possibly c-spine imaging in the near future    #Anxiety  - pt states she is not on any medications    Dispo: [facility] (aiming for [place])
69yo F w/ PMH of anxiety, hx of genital prolapse, hx of nephrolithiasis, OA, presents with left hip pain due to long-standing OA, for elective left total hip replacement now on 3/21.    #left hip OA   - prophylactic Abx with Ancef karlene-operatively  - c/w multimodal pain management w/ Tylenol and celecoxib standing and oxycodone / Dilaudid PRN  - bowel regimen  - VTE ppx with SCDs and planned for Eliquis 2.5mg po BID per ortho protocol (hg stabilized)  - physical therapy eval    #acute blood loss anemia  - due to intra- and post-operative bleeding  - Hgb down from 13.3 on pre-surgical testing to 8.9 and pt given 2un PRBC (one during operation and one in the PACU)  - monitor VS  - pt denies symptoms from anemia currently     #Neuropathy  - pt reports chronic neuropathy in hands and states she had been told she had carpal tunnel syndrome in the past but not pursued treatment  - being started on gabapentin  - will check B12 (can follow up outpatient)  - may need to pursue neuro eval and possibly c-spine imaging in the near future    #Anxiety  - pt states she is not on any medications    #VTE ppx  - SCDs today    Dispo: Home PT or [facility]. Patient medically optimized
69yo F w/ PMH of anxiety, hx of genital prolapse, hx of nephrolithiasis, OA, presents with left hip pain due to long-standing OA, for elective left total hip replacement now on 3/21.    #left hip OA   - prophylactic Abx with Ancef karlene-operatively  - c/w multimodal pain management w/ Tylenol and celecoxib standing and oxycodone / Dilaudid PRN  - bowel regimen  - VTE ppx with SCDs and planned for Eliquis 2.5mg po BID per ortho protocol (hg stabilized)  - physical therapy eval --> [facility]    #acute blood loss anemia  - due to intra- and post-operative bleeding  - Hgb down from 13.3 on pre-surgical testing to 8.9 and pt given 2un PRBC (one during operation and one in the PACU)  - monitor VS  - pt denies symptoms from anemia currently     #Neuropathy  - pt reports chronic neuropathy in hands and states she had been told she had carpal tunnel syndrome in the past but not pursued treatment  - being started on gabapentin  - B12 low normal in the 300s so will start supplementation  - may need to pursue neuro eval and possibly c-spine imaging in the near future    #Anxiety  - pt states she is not on any medications    Dispo: [facility] (aiming for [place])
67yo F w/ PMH of anxiety, hx of genital prolapse, hx of nephrolithiasis, OA, presents with left hip pain due to long-standing OA, for elective left total hip replacement now on 3/21.    #left hip OA   - medically stable for DC  - prophylactic Abx with Ancef karlene-operatively  - c/w multimodal pain management w/ Tylenol and celecoxib standing and oxycodone / Dilaudid PRN  - bowel regimen  - VTE ppx with SCDs and planned for Eliquis 2.5mg po BID per ortho protocol (hg stabilized)  - physical therapy eval --> [facility] vs home PT    #acute blood loss anemia  - due to intra- and post-operative bleeding  - Hgb down from 13.3 on pre-surgical testing to 8.9 and pt given 2un PRBC (one during operation and one in the PACU)  - monitor VS  - pt denies symptoms from anemia currently     #Neuropathy  - pt reports chronic neuropathy in hands and states she had been told she had carpal tunnel syndrome in the past but not pursued treatment  - being started on gabapentin  - B12 low normal in the 300s so will start supplementation  - may need to pursue neuro eval and possibly c-spine imaging in the near future    #Anxiety  - pt states she is not on any medications    Dispo: [facility] (aiming for [place]) vs home PT

## 2024-03-25 NOTE — DISCHARGE NOTE NURSING/CASE MANAGEMENT/SOCIAL WORK - NSDCPEFALRISK_GEN_ALL_CORE
For information on Fall & Injury Prevention, visit: [email or web address] OR  [email or web address] OR  https://www.cdc.gov/steadi/patient.html

## 2024-03-25 NOTE — DISCHARGE NOTE NURSING/CASE MANAGEMENT/SOCIAL WORK - PATIENT PORTAL LINK FT
You can access the FollowMyHealth Patient Portal offered by [facility] by registering at the following website: [email or web address]. By joining [facility]’s FollowMyHealth portal, you will also be able to view your health information using other applications (apps) compatible with our system.

## 2024-03-28 NOTE — HISTORY OF PRESENT ILLNESS
[FreeTextEntry1] : The patient is a 67 yo woman being seen for an initial telehealth visit for radiation therapy for prevention of HO for the Left hip prior to planned total hip replacement planned for 3/21.  HPI  Patient has a history of OA and L hip pain due to advanced degenerative changes of the hip causing increasing difficulties with activities.  No past hip surgery. Not prior RT. L total hip replacement is planned for 3/21.  Dr James referred the patient for RT for prevent heterotopic ossificiation.  Review of Systems- Constitutional: No fever or chills. Cardiovascular: No orthopnea or chest pain Pulmonary: No shortness of breath. GI: No nausea or vomiting or abdominal pain. Musculoskeletal: see HPI Psychiatric: No anxiety and depression.

## 2024-04-08 ENCOUNTER — APPOINTMENT (OUTPATIENT)
Dept: ORTHOPEDIC SURGERY | Facility: CLINIC | Age: 69
End: 2024-04-08
Payer: COMMERCIAL

## 2024-04-08 VITALS — WEIGHT: 140 LBS | BODY MASS INDEX: 23.32 KG/M2 | HEIGHT: 65 IN

## 2024-04-08 PROBLEM — M19.90 UNSPECIFIED OSTEOARTHRITIS, UNSPECIFIED SITE: Chronic | Status: ACTIVE | Noted: 2024-03-08

## 2024-04-08 PROBLEM — M16.12 UNILATERAL PRIMARY OSTEOARTHRITIS, LEFT HIP: Chronic | Status: ACTIVE | Noted: 2024-03-08

## 2024-04-08 PROCEDURE — 99024 POSTOP FOLLOW-UP VISIT: CPT

## 2024-04-08 PROCEDURE — 73502 X-RAY EXAM HIP UNI 2-3 VIEWS: CPT

## 2024-04-08 NOTE — HISTORY OF PRESENT ILLNESS
[de-identified] : Left total hip replacement March 21, 2024 [de-identified] : Reports marked decrease in preoperative hip pain and mild aching operative site patient relates to "overdoing it".  Occasional mild groin discomfort.  Ambulating with a cane.  Notes mild lengthening left legDenies fever chills or neurovascular symptoms left leg [de-identified] : Well-nourished no distress Left hip incision healed passive range of motion satisfactory pain-free neurovascular intact [de-identified] : X-ray AP pelvis left hip satisfactory alignment left total hip components [de-identified] : Left total hip replacement [de-identified] : Physical therapy deferred per patient's request follow-up 1 month

## 2024-04-22 ENCOUNTER — APPOINTMENT (OUTPATIENT)
Dept: ORTHOPEDIC SURGERY | Facility: CLINIC | Age: 69
End: 2024-04-22
Payer: COMMERCIAL

## 2024-04-22 PROCEDURE — 99024 POSTOP FOLLOW-UP VISIT: CPT

## 2024-04-22 NOTE — HISTORY OF PRESENT ILLNESS
[de-identified] : Left total hip replacement March 21, 2024 [de-identified] : Patient reports marked decrease in preop severity of left hip pain.  Denies groin pain does experience minimal aching with activity.  Continues to experience pain contralateral right hip secondary to documented osteoarthritis.  Patient notes mild shortening of right hip secondary to osteoarthritis and is using a small lift.  Patient feels capable of returning to work [de-identified] : Well-nourished no distress Left hip incision healed passive range of motion satisfactory pain-free neurovascular intact [de-identified] : Left total hip replacement [de-identified] : Physical therapy Return to work follow-up 1 month

## 2024-05-03 ENCOUNTER — APPOINTMENT (OUTPATIENT)
Dept: ORTHOPEDIC SURGERY | Facility: CLINIC | Age: 69
End: 2024-05-03

## 2024-05-03 ENCOUNTER — APPOINTMENT (OUTPATIENT)
Dept: ORTHOPEDIC SURGERY | Facility: CLINIC | Age: 69
End: 2024-05-03
Payer: COMMERCIAL

## 2024-05-03 VITALS — HEIGHT: 65 IN | BODY MASS INDEX: 23.32 KG/M2 | WEIGHT: 140 LBS

## 2024-05-03 DIAGNOSIS — M16.11 UNILATERAL PRIMARY OSTEOARTHRITIS, RIGHT HIP: ICD-10-CM

## 2024-05-03 PROCEDURE — 99204 OFFICE O/P NEW MOD 45 MIN: CPT | Mod: 25

## 2024-05-03 PROCEDURE — 20611 DRAIN/INJ JOINT/BURSA W/US: CPT | Mod: RT

## 2024-05-08 NOTE — HISTORY OF PRESENT ILLNESS
[de-identified] : Patient is a 68 year-old female who presents to the office today for initial evaluation of right hip pain.  Patient has several months of intermittent right hip and groin pain.  She is already status post left total hip replacement.  She was seen by her hip replacement surgeon and advised that she might benefit from a cortisone injection in the right hip.  Patient presents for this injection today and would like to proceed at this time.  Patient denies any numbness/tingling, no radiation of pain.  She primarily has a strong achy pain in the right groin region.

## 2024-05-08 NOTE — DISCUSSION/SUMMARY
[de-identified] : 6 discussed findings of today's exam and possible causes of patient's pain.  Educated patient on their most probable diagnosis of chronic intermittent right hip pain with recent atraumatic exacerbation due to underlying osteoarthritis.  Reviewed possible courses of treatment, and we collaboratively decided best course of treatment at this time will include conservative management.  We discussed various treatment options as well as associated risk/benefits/alternatives and patient elected to proceed with cortisone injection today (see procedure note).  Informed the patient that the numbing medicine in today's injection will last for about 4-6 hours. The steroid that was injected will start to work in 1 to 2 days, peak at 1-2 weeks, and may last up to 1-2 months.  Patient will continue with other conservative measures as previously discussed with her hip replacement surgeon.  Follow up as needed.  Patient appreciates and agrees with current plan.  This note was generated using dragon medical dictation software.  A reasonable effort has been made for proofreading its contents, but typos may still remain.  If there are any questions or points of clarification needed please notify my office.

## 2024-05-08 NOTE — PROCEDURE
[de-identified] : Ultrasound-Guided Injection: Right Hip Intra-articular Injection.  Indication for U/S Guidance: Ensure placement within the femoroacetabular joint for diagnostic purposes, while avoiding anterior neurovascular structures  Indication for Injection: Osteoarthritis.  A discussion was had with the patient regarding this procedure and all questions were answered. All risks, benefits and alternatives were discussed. These included but were not limited to bleeding, infection, and allergic reaction.  A timeout was done to ensure correct side and patient agreed to the procedure.  A [place] lu was created on the skin utilizing a plastic needle cap to lu the anticipated point of entry. Alcohol was used to clean the skin, and Betadine was used to sterilize and prep the area in the anterior aspect of the hip joint. The femoroacetabular joint was visualized utilizing the Son[place] II Edge, the Curvilinear 30cm 5-2 MHz transducer, sterile probe cover and sterile ultrasound gel.  The joint was visualized in the longitudinal axis and an in-plane approach was used for the injection.  Ultrasound guidance was utilized to ensure accuracy of the intra-articular injection, and avoid the femoral neurovascular structures. Ethyl chloride spray was then used as a topical anesthetic.  A 22-gauge 3" needle was used to inject 2cc 0.25% bupivacaine and 1cc of 40mg/ml methylprednisolone into the femoroacetabular joint. An image confirming the correct location of the needle placement and infusion of the steroid at the end of the injection was saved.  A sterile bandage was then applied. The patient tolerated the procedure well and there were no complications.

## 2024-05-08 NOTE — PHYSICAL EXAM
[de-identified] : Constitutional: Well-nourished, well-developed, No acute distress Respiratory:  Good respiratory effort, no SOB Lymphatic: No regional lymphadenopathy, no lymphedema Psychiatric: Pleasant and normal affect, alert and oriented x3 Skin: Clean dry and intact B/L LE Musculoskeletal: normal except where as noted in regional exam  Right Hip: APPEARANCE: no marked deformities, no swelling or malalignment POSITIVE TENDERNESS: Hip flexor region, sartorius, proximal rectus femoris NONTENDER: greater trochanter, TFL, gluteal region, ischium/proximal hamstring region, and pubic symphysis.  ROM: Limited in all planes due to pain.  RESISTIVE TESTING: MMT 4+/5 and mild pain with hip flexion, painless resisted ER/IR/SLR/abduction/adduction.  SPECIAL TESTS: + MARIANA/FADIR, mild pain with loaded flexion & scouring. neg fulcrum test

## 2024-05-20 ENCOUNTER — APPOINTMENT (OUTPATIENT)
Dept: ORTHOPEDIC SURGERY | Facility: CLINIC | Age: 69
End: 2024-05-20
Payer: COMMERCIAL

## 2024-05-20 VITALS — BODY MASS INDEX: 23.32 KG/M2 | WEIGHT: 140 LBS | HEIGHT: 65 IN

## 2024-05-20 DIAGNOSIS — Z96.642 PRESENCE OF LEFT ARTIFICIAL HIP JOINT: ICD-10-CM

## 2024-05-20 PROCEDURE — 99024 POSTOP FOLLOW-UP VISIT: CPT

## 2024-05-20 PROCEDURE — 73502 X-RAY EXAM HIP UNI 2-3 VIEWS: CPT

## 2024-05-20 NOTE — HISTORY OF PRESENT ILLNESS
[de-identified] : Left total hip replacement March 21, 2024 [de-identified] : Patient reports marked decrease in preop severity of left hip pain.  Denies groin pain does experience minimal aching with activity.  Continues to experience pain contralateral right hip secondary to documented osteoarthritis.With improvement in the past following steroid injection   [de-identified] : Well-nourished no distress Left hip incision healed passive range of motion satisfactory pain-free neurovascular intact [de-identified] : X-ray AP pelvis left hip reveals maintenance of satisfactory component alignment [de-identified] : Left total hip replacement [de-identified] : Follow-up 2 months

## 2024-07-15 ENCOUNTER — APPOINTMENT (OUTPATIENT)
Dept: ORTHOPEDIC SURGERY | Facility: CLINIC | Age: 69
End: 2024-07-15

## 2024-07-15 VITALS — HEIGHT: 65 IN | BODY MASS INDEX: 23.32 KG/M2 | WEIGHT: 140 LBS

## 2024-07-15 DIAGNOSIS — M16.11 UNILATERAL PRIMARY OSTEOARTHRITIS, RIGHT HIP: ICD-10-CM

## 2024-07-15 DIAGNOSIS — Z96.641 PRESENCE OF RIGHT ARTIFICIAL HIP JOINT: ICD-10-CM

## 2024-07-15 DIAGNOSIS — Z96.642 PRESENCE OF LEFT ARTIFICIAL HIP JOINT: ICD-10-CM

## 2024-07-15 PROCEDURE — 73502 X-RAY EXAM HIP UNI 2-3 VIEWS: CPT

## 2024-07-15 PROCEDURE — 99213 OFFICE O/P EST LOW 20 MIN: CPT

## 2024-07-22 ENCOUNTER — NON-APPOINTMENT (OUTPATIENT)
Age: 69
End: 2024-07-22

## 2024-07-24 ENCOUNTER — NON-APPOINTMENT (OUTPATIENT)
Age: 69
End: 2024-07-24

## 2024-07-29 ENCOUNTER — NON-APPOINTMENT (OUTPATIENT)
Age: 69
End: 2024-07-29

## 2024-09-25 ENCOUNTER — APPOINTMENT (OUTPATIENT)
Dept: PAIN MANAGEMENT | Facility: CLINIC | Age: 69
End: 2024-09-25

## 2024-10-02 ENCOUNTER — NON-APPOINTMENT (OUTPATIENT)
Age: 69
End: 2024-10-02

## 2024-10-07 ENCOUNTER — APPOINTMENT (OUTPATIENT)
Dept: ORTHOPEDIC SURGERY | Facility: CLINIC | Age: 69
End: 2024-10-07
Payer: COMMERCIAL

## 2024-10-07 VITALS — HEIGHT: 65 IN | BODY MASS INDEX: 24.16 KG/M2 | WEIGHT: 145 LBS

## 2024-10-07 DIAGNOSIS — Z96.642 PRESENCE OF LEFT ARTIFICIAL HIP JOINT: ICD-10-CM

## 2024-10-07 DIAGNOSIS — M16.11 UNILATERAL PRIMARY OSTEOARTHRITIS, RIGHT HIP: ICD-10-CM

## 2024-10-07 PROCEDURE — 99213 OFFICE O/P EST LOW 20 MIN: CPT

## 2024-10-11 ENCOUNTER — APPOINTMENT (OUTPATIENT)
Dept: RADIATION ONCOLOGY | Facility: CLINIC | Age: 69
End: 2024-10-11

## 2024-10-11 ENCOUNTER — OUTPATIENT (OUTPATIENT)
Dept: OUTPATIENT SERVICES | Facility: HOSPITAL | Age: 69
LOS: 1 days | End: 2024-10-11
Payer: COMMERCIAL

## 2024-10-11 VITALS
DIASTOLIC BLOOD PRESSURE: 83 MMHG | RESPIRATION RATE: 18 BRPM | OXYGEN SATURATION: 98 % | TEMPERATURE: 98 F | HEIGHT: 66 IN | WEIGHT: 151.02 LBS | SYSTOLIC BLOOD PRESSURE: 124 MMHG | HEART RATE: 68 BPM

## 2024-10-11 DIAGNOSIS — Z96.642 PRESENCE OF LEFT ARTIFICIAL HIP JOINT: Chronic | ICD-10-CM

## 2024-10-11 DIAGNOSIS — Z98.890 OTHER SPECIFIED POSTPROCEDURAL STATES: Chronic | ICD-10-CM

## 2024-10-11 DIAGNOSIS — M16.11 UNILATERAL PRIMARY OSTEOARTHRITIS, RIGHT HIP: ICD-10-CM

## 2024-10-11 DIAGNOSIS — N81.81 PERINEOCELE: Chronic | ICD-10-CM

## 2024-10-11 DIAGNOSIS — Z01.818 ENCOUNTER FOR OTHER PREPROCEDURAL EXAMINATION: ICD-10-CM

## 2024-10-11 DIAGNOSIS — K62.3 RECTAL PROLAPSE: Chronic | ICD-10-CM

## 2024-10-11 LAB
A1C WITH ESTIMATED AVERAGE GLUCOSE RESULT: 5.4 % — SIGNIFICANT CHANGE UP (ref 4–5.6)
ALBUMIN SERPL ELPH-MCNC: 4 G/DL — SIGNIFICANT CHANGE UP (ref 3.3–5)
ALP SERPL-CCNC: 76 U/L — SIGNIFICANT CHANGE UP (ref 30–120)
ALT FLD-CCNC: 22 U/L — SIGNIFICANT CHANGE UP (ref 10–60)
ANION GAP SERPL CALC-SCNC: 6 MMOL/L — SIGNIFICANT CHANGE UP (ref 5–17)
APTT BLD: 30.2 SEC — SIGNIFICANT CHANGE UP (ref 24.5–35.6)
AST SERPL-CCNC: 15 U/L — SIGNIFICANT CHANGE UP (ref 10–40)
BILIRUB SERPL-MCNC: 0.4 MG/DL — SIGNIFICANT CHANGE UP (ref 0.2–1.2)
BLD GP AB SCN SERPL QL: SIGNIFICANT CHANGE UP
BUN SERPL-MCNC: 15 MG/DL — SIGNIFICANT CHANGE UP (ref 7–23)
CALCIUM SERPL-MCNC: 9.7 MG/DL — SIGNIFICANT CHANGE UP (ref 8.4–10.5)
CHLORIDE SERPL-SCNC: 103 MMOL/L — SIGNIFICANT CHANGE UP (ref 96–108)
CO2 SERPL-SCNC: 30 MMOL/L — SIGNIFICANT CHANGE UP (ref 22–31)
CREAT SERPL-MCNC: 0.63 MG/DL — SIGNIFICANT CHANGE UP (ref 0.5–1.3)
EGFR: 98 ML/MIN/1.73M2 — SIGNIFICANT CHANGE UP
ESTIMATED AVERAGE GLUCOSE: 108 MG/DL — SIGNIFICANT CHANGE UP (ref 68–114)
GLUCOSE SERPL-MCNC: 98 MG/DL — SIGNIFICANT CHANGE UP (ref 70–99)
HCT VFR BLD CALC: 41.6 % — SIGNIFICANT CHANGE UP (ref 34.5–45)
HGB BLD-MCNC: 14 G/DL — SIGNIFICANT CHANGE UP (ref 11.5–15.5)
INR BLD: 0.93 RATIO — SIGNIFICANT CHANGE UP (ref 0.85–1.16)
MCHC RBC-ENTMCNC: 31.1 PG — SIGNIFICANT CHANGE UP (ref 27–34)
MCHC RBC-ENTMCNC: 33.7 G/DL — SIGNIFICANT CHANGE UP (ref 32–36)
MCV RBC AUTO: 92.4 FL — SIGNIFICANT CHANGE UP (ref 80–100)
MRSA PCR RESULT.: SIGNIFICANT CHANGE UP
NRBC # BLD: 0 /100 WBCS — SIGNIFICANT CHANGE UP (ref 0–0)
PLATELET # BLD AUTO: 262 K/UL — SIGNIFICANT CHANGE UP (ref 150–400)
POTASSIUM SERPL-MCNC: 3.8 MMOL/L — SIGNIFICANT CHANGE UP (ref 3.5–5.3)
POTASSIUM SERPL-SCNC: 3.8 MMOL/L — SIGNIFICANT CHANGE UP (ref 3.5–5.3)
PROT SERPL-MCNC: 7 G/DL — SIGNIFICANT CHANGE UP (ref 6–8.3)
PROTHROM AB SERPL-ACNC: 11 SEC — SIGNIFICANT CHANGE UP (ref 9.9–13.4)
RBC # BLD: 4.5 M/UL — SIGNIFICANT CHANGE UP (ref 3.8–5.2)
RBC # FLD: 12.4 % — SIGNIFICANT CHANGE UP (ref 10.3–14.5)
S AUREUS DNA NOSE QL NAA+PROBE: SIGNIFICANT CHANGE UP
SODIUM SERPL-SCNC: 139 MMOL/L — SIGNIFICANT CHANGE UP (ref 135–145)
WBC # BLD: 6.27 K/UL — SIGNIFICANT CHANGE UP (ref 3.8–10.5)
WBC # FLD AUTO: 6.27 K/UL — SIGNIFICANT CHANGE UP (ref 3.8–10.5)

## 2024-10-11 PROCEDURE — 99443: CPT | Mod: GC,93

## 2024-10-11 PROCEDURE — 83036 HEMOGLOBIN GLYCOSYLATED A1C: CPT

## 2024-10-11 PROCEDURE — 85610 PROTHROMBIN TIME: CPT

## 2024-10-11 PROCEDURE — 85027 COMPLETE CBC AUTOMATED: CPT

## 2024-10-11 PROCEDURE — G0463: CPT

## 2024-10-11 PROCEDURE — 80053 COMPREHEN METABOLIC PANEL: CPT

## 2024-10-11 PROCEDURE — 93010 ELECTROCARDIOGRAM REPORT: CPT

## 2024-10-11 PROCEDURE — 86850 RBC ANTIBODY SCREEN: CPT

## 2024-10-11 PROCEDURE — 85730 THROMBOPLASTIN TIME PARTIAL: CPT

## 2024-10-11 PROCEDURE — 86900 BLOOD TYPING SEROLOGIC ABO: CPT

## 2024-10-11 PROCEDURE — 87641 MR-STAPH DNA AMP PROBE: CPT

## 2024-10-11 PROCEDURE — 86901 BLOOD TYPING SEROLOGIC RH(D): CPT

## 2024-10-11 PROCEDURE — 93005 ELECTROCARDIOGRAM TRACING: CPT

## 2024-10-11 PROCEDURE — 36415 COLL VENOUS BLD VENIPUNCTURE: CPT

## 2024-10-11 PROCEDURE — 87640 STAPH A DNA AMP PROBE: CPT

## 2024-10-11 NOTE — H&P PST ADULT - COMMENTS
denies h/oDVT, PE  denies any current cold or flu like symptoms, including fever, cough, sinus congestion, body aches or chills

## 2024-10-11 NOTE — H&P PST ADULT - HISTORY OF PRESENT ILLNESS
68 year old female PMH Acute Anxiety, Acute Depression, Fecal Incontinence, Genital Prolapse, Kidney Stone on Right Side, Microhematuria, Nocturia, Renal Cyst, Vitamin D Deficiency, COVID-19; Cystocele, Rectocele, Stress Incontinence Female; presents today for PST prior to Right  total hip replacement. Patient states the pain is severe at night and wakes her up middle of the night. She rate 8/10 pain. Some  relief with Tylenol 500 mg daily. Failed conservative therapy. Patient is scheduled for Right  total hip replacement on 11/5/2024, patient reports she is not on any prescribed medications.

## 2024-10-11 NOTE — H&P PST ADULT - NEGATIVE ENMT SYMPTOMS
dentures lower partial/no hearing difficulty/no vertigo/no sinus symptoms/no nasal congestion/no post-nasal discharge/no abnormal taste sensation/no throat pain/no dysphagia

## 2024-10-11 NOTE — H&P PST ADULT - NSICDXPASTSURGICALHX_GEN_ALL_CORE_FT
PAST SURGICAL HISTORY:  H/O breast biopsy     H/O dilation and curettage     Perineocele     Rectal prolapse     S/P hip replacement, left     Status post creation of urethral sling by suprapubic approach

## 2024-10-11 NOTE — H&P PST ADULT - NSICDXPASTMEDICALHX_GEN_ALL_CORE_FT
PAST MEDICAL HISTORY:  2019 novel coronavirus disease (COVID-19)     Acute anxiety     Acute depression     Fecal incontinence     Female cystocele     Genital prolapse     History of multiple miscarriages     Kidney stone on right side     Microhematuria     Nocturia     Osteoarthritis     Osteoarthritis of left hip     Osteoarthritis of right hip     Rectocele     Renal cyst     Stress incontinence, female     Vaginal delivery     Vitamin D deficiency

## 2024-10-11 NOTE — H&P PST ADULT - PROBLEM SELECTOR PLAN 1
69 y/o female with Right hip  pain  scheduled surgery: Right anterior total hip replacement  will obtain medical clearance  Patient provided with pre-operative instructions and verbalized understanding   Patient will be NPO on day of surgery.   Patient will stop NSAIDs, aspirin, herbal supplements or vitamins 1 week prior to surgery.    Chlorhexidine wash and Gatorade provided with instructions.

## 2024-10-14 ENCOUNTER — APPOINTMENT (OUTPATIENT)
Dept: INTERNAL MEDICINE | Facility: CLINIC | Age: 69
End: 2024-10-14
Payer: COMMERCIAL

## 2024-10-14 VITALS
HEIGHT: 65 IN | RESPIRATION RATE: 16 BRPM | DIASTOLIC BLOOD PRESSURE: 60 MMHG | OXYGEN SATURATION: 97 % | SYSTOLIC BLOOD PRESSURE: 110 MMHG | WEIGHT: 150 LBS | HEART RATE: 68 BPM | BODY MASS INDEX: 24.99 KG/M2

## 2024-10-14 DIAGNOSIS — Z01.818 ENCOUNTER FOR OTHER PREPROCEDURAL EXAMINATION: ICD-10-CM

## 2024-10-14 DIAGNOSIS — M25.519 PAIN IN UNSPECIFIED SHOULDER: ICD-10-CM

## 2024-10-14 PROCEDURE — 99214 OFFICE O/P EST MOD 30 MIN: CPT

## 2024-10-14 RX ORDER — IBUPROFEN 800 MG/1
800 TABLET, FILM COATED ORAL 3 TIMES DAILY
Qty: 21 | Refills: 0 | Status: ACTIVE | COMMUNITY
Start: 2024-10-14 | End: 1900-01-01

## (undated) DEVICE — SOL IRR BAG H2O 3000ML

## (undated) DEVICE — SUT TEVDEK 1 30" KC-6

## (undated) DEVICE — SUT POLYSORB 2-0 30" GS-22 UNDYED

## (undated) DEVICE — BAG DECANTER 2

## (undated) DEVICE — DRAPE PATIENT ISOLATION

## (undated) DEVICE — SOL IRR BAG NS 0.9% 3000ML

## (undated) DEVICE — SUT POLYSORB 0 60" REEL

## (undated) DEVICE — PLV-SCD MACHINE: Type: DURABLE MEDICAL EQUIPMENT

## (undated) DEVICE — SUCTION YANKAUER TAPERED BULBOUS NO VENT

## (undated) DEVICE — DRAPE TOWEL BLUE 17" X 24"

## (undated) DEVICE — FEMORAL CANAL SUCTION TIP IRR SYS

## (undated) DEVICE — DRAPE 3/4 SHEET 52X76"

## (undated) DEVICE — SUT MAXON 2-0 30" GS-22

## (undated) DEVICE — PREP TRAY DRY SKIN PREP SCRUB

## (undated) DEVICE — NDL HYPO SAFE 22G X 1.5" (BLACK)

## (undated) DEVICE — ELCTR GROUNDING PAD ADULT COVIDIEN

## (undated) DEVICE — VISITEC 4X4

## (undated) DEVICE — PLV/PSP-ESU FORCEFX F8J7721A: Type: DURABLE MEDICAL EQUIPMENT

## (undated) DEVICE — DRSG SILVERLON ISLAND 4X10" 2X8" PAD

## (undated) DEVICE — LONE STAR RETRACTOR RING 32.5CM X 18.3CM DISP

## (undated) DEVICE — GOWN TRIMAX XXL

## (undated) DEVICE — PREP BETADINE KIT

## (undated) DEVICE — HOOD FLYTE STRYKER HELMET SHIELD

## (undated) DEVICE — SOL IRR POUR H2O 1500ML

## (undated) DEVICE — POSITIONER STIRRUP STRAP W SLIP RING 19X3.5"

## (undated) DEVICE — VENODYNE/SCD SLEEVE CALF MEDIUM

## (undated) DEVICE — LONE STAR ELASTIC STAY HOOK 5MM SHARP

## (undated) DEVICE — DRAPE INSTRUMENT POUCH 6.75" X 11"

## (undated) DEVICE — ELCTR BOVIE TIP BLADE INSULATED 2.75" EDGE

## (undated) DEVICE — ELCTR BOVIE PENCIL HANDPIECE

## (undated) DEVICE — DRAPE UNDER BUTTOCKS W SCREEN

## (undated) DEVICE — SOLIDIFIER CANN EXPRESS 3K

## (undated) DEVICE — SUT POLYSORB 2-0 30" GS-21 UNDYED

## (undated) DEVICE — DRAPE MAYO STAND 23"

## (undated) DEVICE — SPECIMEN CONTAINER PET

## (undated) DEVICE — GOWN SMARTGOWN RAGLAN XLG

## (undated) DEVICE — DRSG PAD SANITARY OB

## (undated) DEVICE — TUBING CONNECTING 6MM 20FT

## (undated) DEVICE — DRAPE TOP SHEET 53" X 101"

## (undated) DEVICE — SYR CATH TIP 2 OZ

## (undated) DEVICE — DRSG SILVERLON ISLAND 4X6" 2X4" PAD

## (undated) DEVICE — SYR LUER LOK 20CC

## (undated) DEVICE — HOOD T5 PEELAWAY

## (undated) DEVICE — ELCTR BOVIE TIP BLADE INSULATED 6.5" EDGE

## (undated) DEVICE — SUT POLYSORB 1 27" GS-12 UNDYED

## (undated) DEVICE — FOLEY TRAY 16FR 5CC LTX URINE METER TEMP SENSING CLOSED

## (undated) DEVICE — ELCTR STRYKER NEPTUNE SMOKE EVACUATION PENCIL (GREEN)

## (undated) DEVICE — HANDPIECE INTERPULSE W MULTI TIP

## (undated) DEVICE — SUT POLYSORB 2-0 30" GS-11 UNDYED

## (undated) DEVICE — SOL IRR POUR NS 0.9% 500ML

## (undated) DEVICE — DRSG DERMABOND 0.7ML

## (undated) DEVICE — PACK TOTAL HIP

## (undated) DEVICE — DRAIN JACKSON PRATT 3 SPRING RESERVOIR W 10FR PVC DRAIN

## (undated) DEVICE — TUBING TUR 2 PRONG

## (undated) DEVICE — POSITIONER STRAP ARMBOARD VELCRO TS-30

## (undated) DEVICE — DRAPE C ARM 41X140"

## (undated) DEVICE — DRAPE LAVH 124" X 30" X125"

## (undated) DEVICE — TUBING FOR SMOKE EVACUATOR (PURPLE END)

## (undated) DEVICE — PACK MINOR WITH LAP

## (undated) DEVICE — Device

## (undated) DEVICE — BAG SPONGE COUNTER EZ

## (undated) DEVICE — GLV 8 PROTEXIS (WHITE)

## (undated) DEVICE — GLV 6.5 PROTEXIS (WHITE)

## (undated) DEVICE — POSITIONER POSITIONING ROLL  5X17"

## (undated) DEVICE — BAG DECANTER IV STERILE

## (undated) DEVICE — SUT POLYSORB 0 30" GS-21 UNDYED

## (undated) DEVICE — DRAPE IOBAN 23" X 23"

## (undated) DEVICE — WARMING BLANKET UPPER ADULT

## (undated) DEVICE — SUT MONOSOF 3-0 30" C-16

## (undated) DEVICE — WOUND IRR IRRISEPT W 0.5 CHG

## (undated) DEVICE — ELCTR AQUAMANTYS BIPOLAR SEALER 6.0

## (undated) DEVICE — WARMING BLANKET FULL ADULT

## (undated) DEVICE — FOLEY TRAY 16FR LF URINE METER SURESTEP

## (undated) DEVICE — SAW BLADE STRYKER SAGITTAL AGGRESSIVE 25X86.5X1.32MM

## (undated) DEVICE — POSITIONER HANA PATIENT CARE KIT POSITION SUPINE